# Patient Record
Sex: FEMALE | Race: WHITE | NOT HISPANIC OR LATINO | Employment: FULL TIME | URBAN - METROPOLITAN AREA
[De-identification: names, ages, dates, MRNs, and addresses within clinical notes are randomized per-mention and may not be internally consistent; named-entity substitution may affect disease eponyms.]

---

## 2023-08-08 ENCOUNTER — TELEPHONE (OUTPATIENT)
Dept: NEUROLOGY | Facility: CLINIC | Age: 33
End: 2023-08-08

## 2023-08-08 NOTE — TELEPHONE ENCOUNTER
[facility] Call [facility]    Phone Message    May a detailed message be left on voicemail: yes     Reason for Call: Other: Eform received requesting to see Dr. Arshad in the MS clinic. LVM for the patient to call back. No referral is in the system     Action Taken: Other: N/A    Travel Screening: Not Applicable

## 2023-08-11 ENCOUNTER — TRANSCRIBE ORDERS (OUTPATIENT)
Dept: OTHER | Age: 33
End: 2023-08-11

## 2023-08-11 DIAGNOSIS — G35 MULTIPLE SCLEROSIS (H): Primary | ICD-10-CM

## 2023-08-18 NOTE — TELEPHONE ENCOUNTER
Action 8/18/23 MV 2.12pm   Action Taken Imaging request faxed to Dayana Johnson , [facility]  Fax # [phone]  Tracking # [id number]     Action 8/21/23 MV 8.33am   Action Taken Dayana Johnson images resolved in PACS     Action Janet Calvin on 8/21/2023 at 1:04 PM   Action Taken Imaging disc received from [facility]. Sent to  for processing. -     RECORDS RECEIVED FROM: internal   REASON FOR VISIT: MS   Date of Appt:   9.27.23   NOTES (FOR ALL VISITS) STATUS DETAILS   OFFICE NOTE from referring provider Care Everywhere Dr Jean Hutchinson @ [facility]:  8/3/23   OFFICE NOTE from other specialist Care Everywhere Mer Jimenes NP @ [facility]:  7/7/23 5/11/23 1/20/23 11/18/22  (Additional encounters)    Dr Cas Palacios @ [facility]:  3/3/23    Dr Seven Stiles @ [facility]:  6/21/22    Dr Jessenia Mcrae @ [facility]:  6/7/22    Dr Evita Gillespie @ [facility]:  10/28/21  10/15/21  9/10/21   DISCHARGE SUMMARY from hospital Care Everywhere [facility]:  4/11/22-4/12/22    [facility]:  10/15/21-10/18/21   DISCHARGE REPORT from the ER Care Everywhere [facility]:  9/11/21    [facility]:  9/8/21   EMG Care Everywhere [facility]:  9/20/21   MEDICATION LIST Care Everywhere    IMAGING  (FOR ALL VISITS)     LUMBAR PUNCTURE Care Everywhere [facility]:  9/13/21   MRI (HEAD, NECK, SPINE) PACS [facility]:  MRI Brain 6/7/23  MRI Thoracic Spine 10/10/22  MRI Cervical Spine 10/10/22  MRI Brain 4/11/22  MRI Cervical Spine 4/11/22    [facility]:  MRI Cervical Spine 1/14/22  MRI Brain 1/14/22  MRI Thoracic Spine 11/2/21  MRI Brain 10/16/21  MRI Cervical Spine 10/16/21  MRI Thoracic Spine 9/9/21  MRI Cervical Spine 9/9/21    [facility]:  MRI Brain 9/8/21   CT (HEAD, NECK, SPINE) PACS [facility]:  CTA Head Neck 10/15/21  CT Head 10/15/21

## 2023-08-23 ENCOUNTER — DOCUMENTATION ONLY (OUTPATIENT)
Dept: NEUROLOGY | Facility: CLINIC | Age: 33
End: 2023-08-23
Payer: OTHER GOVERNMENT

## 2023-08-23 NOTE — PROGRESS NOTES
Image disc received from [facility] for MRI's of the brain,cervical, thoracic, pelvis and chest.   Renny Chaudhari EMT 08/23/2023 11:06AM

## 2023-08-30 ENCOUNTER — PRE VISIT (OUTPATIENT)
Dept: NEUROLOGY | Facility: CLINIC | Age: 33
End: 2023-08-30
Payer: OTHER GOVERNMENT

## 2023-09-27 ENCOUNTER — OFFICE VISIT (OUTPATIENT)
Dept: NEUROLOGY | Facility: CLINIC | Age: 33
End: 2023-09-27
Attending: PSYCHIATRY & NEUROLOGY
Payer: OTHER GOVERNMENT

## 2023-09-27 VITALS
HEART RATE: 92 BPM | DIASTOLIC BLOOD PRESSURE: 86 MMHG | SYSTOLIC BLOOD PRESSURE: 119 MMHG | HEIGHT: 67 IN | OXYGEN SATURATION: 98 % | BODY MASS INDEX: 45.99 KG/M2 | WEIGHT: 293 LBS

## 2023-09-27 DIAGNOSIS — Z51.81 THERAPEUTIC DRUG MONITORING: ICD-10-CM

## 2023-09-27 DIAGNOSIS — Z13.21 ENCOUNTER FOR VITAMIN DEFICIENCY SCREENING: ICD-10-CM

## 2023-09-27 DIAGNOSIS — G35 MS (MULTIPLE SCLEROSIS) (H): Primary | ICD-10-CM

## 2023-09-27 PROCEDURE — G0463 HOSPITAL OUTPT CLINIC VISIT: HCPCS | Performed by: PSYCHIATRY & NEUROLOGY

## 2023-09-27 PROCEDURE — 99205 OFFICE O/P NEW HI 60 MIN: CPT | Performed by: PSYCHIATRY & NEUROLOGY

## 2023-09-27 RX ORDER — ALBUTEROL SULFATE 90 UG/1
1-2 AEROSOL, METERED RESPIRATORY (INHALATION)
Status: CANCELLED
Start: 2024-01-01

## 2023-09-27 RX ORDER — METHYLPREDNISOLONE SODIUM SUCCINATE 125 MG/2ML
125 INJECTION, POWDER, LYOPHILIZED, FOR SOLUTION INTRAMUSCULAR; INTRAVENOUS
Status: CANCELLED
Start: 2024-01-01

## 2023-09-27 RX ORDER — LEVOTHYROXINE SODIUM 75 UG/1
75 TABLET ORAL
COMMUNITY
Start: 2023-08-01 | End: 2024-08-02

## 2023-09-27 RX ORDER — ACETAMINOPHEN 325 MG/1
650 TABLET ORAL ONCE
Status: CANCELLED | OUTPATIENT
Start: 2024-01-01

## 2023-09-27 RX ORDER — METHYLPREDNISOLONE SODIUM SUCCINATE 125 MG/2ML
125 INJECTION, POWDER, LYOPHILIZED, FOR SOLUTION INTRAMUSCULAR; INTRAVENOUS ONCE
Status: CANCELLED | OUTPATIENT
Start: 2024-01-01

## 2023-09-27 RX ORDER — ALBUTEROL SULFATE 0.83 MG/ML
2.5 SOLUTION RESPIRATORY (INHALATION)
Status: CANCELLED | OUTPATIENT
Start: 2024-01-01

## 2023-09-27 RX ORDER — HEPARIN SODIUM (PORCINE) LOCK FLUSH IV SOLN 100 UNIT/ML 100 UNIT/ML
5 SOLUTION INTRAVENOUS
Status: CANCELLED | OUTPATIENT
Start: 2024-01-01

## 2023-09-27 RX ORDER — MEPERIDINE HYDROCHLORIDE 25 MG/ML
25 INJECTION INTRAMUSCULAR; INTRAVENOUS; SUBCUTANEOUS EVERY 30 MIN PRN
Status: CANCELLED | OUTPATIENT
Start: 2024-01-01

## 2023-09-27 RX ORDER — DIPHENHYDRAMINE HYDROCHLORIDE 50 MG/ML
50 INJECTION INTRAMUSCULAR; INTRAVENOUS ONCE
Status: CANCELLED
Start: 2024-01-01

## 2023-09-27 RX ORDER — HEPARIN SODIUM,PORCINE 10 UNIT/ML
5-20 VIAL (ML) INTRAVENOUS DAILY PRN
Status: CANCELLED | OUTPATIENT
Start: 2024-01-01

## 2023-09-27 RX ORDER — CETIRIZINE HYDROCHLORIDE 10 MG/1
10 TABLET ORAL DAILY
COMMUNITY
End: 2024-04-10

## 2023-09-27 RX ORDER — DIPHENHYDRAMINE HYDROCHLORIDE 50 MG/ML
50 INJECTION INTRAMUSCULAR; INTRAVENOUS
Status: CANCELLED
Start: 2024-01-01

## 2023-09-27 RX ORDER — EPINEPHRINE 1 MG/ML
0.3 INJECTION, SOLUTION, CONCENTRATE INTRAVENOUS EVERY 5 MIN PRN
Status: CANCELLED | OUTPATIENT
Start: 2024-01-01

## 2023-09-27 RX ORDER — CHOLECALCIFEROL (VITAMIN D3) 50 MCG
50 TABLET ORAL DAILY
COMMUNITY

## 2023-09-27 ASSESSMENT — PAIN SCALES - GENERAL: PAINLEVEL: NO PAIN (0)

## 2023-09-27 NOTE — PATIENT INSTRUCTIONS
I am glad to hear that you are doing well     Continue ocrevus     Blood work today and on the day of infusion     Mri spinal cord     Follow up in 6 months

## 2023-09-27 NOTE — PROGRESS NOTES
Date of Service: 9/27/2023    [facility] Neurology   MS Clinic Evaluation    Subjective: 33-year-old woman with a history of hypothyroidism who presents for evaluation of multiple sclerosis.    Disease onset at age 31 when she had an episode of sensory myelitis.  This started in the right abdomen and then progressed to affect the entire lower half of the body.  Symptoms persisted for approximately 3 months.  Imaging at that time was unremarkable.  CSF was positive for oligoclonal bands, so multiple sclerosis was suspected.  Approximately 1 month later she experienced Lhermitte's phenomenon with symptoms radiating into the left arm.  MRI was positive for a demyelinating lesion in the cervical spine.  She was initiated with treatment with Copaxone.  Symptoms gradually resolved over the course of a few months.    She does have residual symptoms from her initial attack.  Her left side will feel weak and will intermittently tingle.  She has occasional neck pain.  However she does notice in the past 6 months the symptoms have reduced.    In the year of 2022 she struggled with fluctuating cognitive fog.  Symptoms could be quite significant at times.  She had an MRI that was performed in April 2022 which was remarkable for new gadolinium enhancing lesion in the left centrum semiovale.  They recommended watchful waiting.  However follow-up imaging later that year revealed 3 new lesions.  She was subsequently switched to ocrelizumab.    She received her first dose of ocrelizumab in December 2022.  She did have itching as a side effect.  This has been adequately managed when she is premedicated with IV Benadryl.  She received her most recent dose of ocrelizumab on July 1.    She feels that in the past several weeks that she has been feeling quite better.  Cognitive fog has reduced.  She is noticing less symptoms on the left side.  However 3 weeks ago she did have a couple days of cognitive fog and some return of left-sided  "weakness.  This only lasted a couple of days and then resolved.    She did have a 1.5 week long episode of weakness that was more prominent on the right side in July. This was in the setting of heightened stress/[place].    She has not struggled with infections aside from 1 possible urinary tract infection, though she does report that UA was negative.    She does take vitamin D3 1000 international units daily.    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  Last relapse: Age 32, cognitive fog in the setting of MRI positive for active lesions    DMD hx:   Glatiramer 12/2021-12/2022, radiologic and clinical progression  Ocrevus 12/2/2022-present, LD 6/12/23    No Known Allergies    Current Outpatient Medications   Medication     cetirizine (ZYRTEC) 10 MG tablet     levothyroxine (SYNTHROID/LEVOTHROID) 75 MCG tablet     ocrelizumab (OCREVUS) 300 MG/10ML SOLN injection     vitamin D3 (CHOLECALCIFEROL) 50 mcg (2000 units) tablet     No current facility-administered medications for this visit.        Past medical, surgical, social and family history was personally reviewed. Pertinent details noted above.     Physical Examination:   /86 (BP Location: Right arm, Patient Position: Sitting, Cuff Size: Adult Large)   Pulse 92   Ht 1.7 m (5' 6.93\")   Wt 144.4 kg (318 lb 6.4 oz)   SpO2 98%   BMI 49.97 kg/m      General: no acute distress  Cranial nerves:   VFFC  PERRL w/no RAPD  EOM full w/no YANCY   Face symmetric  Hearing intact  No dysarthria   Motor:   Tone is normal   Bulk is normal     R L  Deltoid  5 5  Biceps  5 5  Triceps 5 5  Wrist ext 5 5  Finger ext 5 5  Finger abd 5 5    Hip flexion 5 5  Knee flexion 5 5  Knee ext 5 5  Ankle d/f 5 5    Reflexes: 2+ and symmetric throughout, babinski absent bilaterally  Sensory: vibration is mildly reduced in the L toe, JPS normal in the toes   Romberg is absent  Coordination: no ataxia or dysmetria  Gait: normal base and stride, " tandem gait is intact, able to balance on one foot and hop x 5 bilaterally    Tests/Imaging:   CSF +ocb    Vitamin D 12  JCV Ab 0.58    ALC 1600  [id number]    MRI Brain  6/2023 - approx 5 PVL/DWML, dirty white matter, a bit of atrophy for age, gd-     MRI Cervical spine   10/2022 -?upper cervical right dorsal cord lesion, images fairly grainy    MRI Thoracic spine   10/2022- couple mid thoracic lesions, gd-     Assessment: 33-year-old woman with relapsing remitting multiple sclerosis who had persistent disease activity despite compliance with glatiramer acetate and has appropriately been switched to ocrelizumab.  She seems to be tolerating treatment well.  Her clinical examination is nearly normal.    It is appropriate for her to continue to receive ocrelizumab every 6 months.  I would like her to undergo an updated MRI of the spinal cord to ensure no evidence of new lesion formation given the episode that she had in July.    Plan:   -Continue Ocrevus 600 mg IV every 6 months, next dose due January 1  - Blood work today and again on the day of infusion to monitor for hematologic or immunologic toxicity  - MRI cervical and thoracic spine  - Follow-up in 6 months    Note was completed with the assistance of Dragon Fluency software which can often result in accidental word substitutions.     A total of 60 minutes on the date of service were spent in the care of this patient.   Mohini Arshad MD on 9/27/2023 at 12:48 PM

## 2023-09-27 NOTE — NURSING NOTE
Chief Complaint   Patient presents with    MS    New Patient     Establishing MS care      Vitals were taken and medications were reconciled.   Renny Chaudhari, EMT  2:35 PM

## 2023-09-28 ENCOUNTER — DOCUMENTATION ONLY (OUTPATIENT)
Dept: NEUROLOGY | Facility: CLINIC | Age: 33
End: 2023-09-28
Payer: OTHER GOVERNMENT

## 2023-09-28 NOTE — PROGRESS NOTES
Ocrevus access solution notice received, patients service request is currently in process.   [id number].  Renny Chaudhari EMT 09/28/2023 2:29PM

## 2023-10-03 ENCOUNTER — TELEPHONE (OUTPATIENT)
Dept: NEUROLOGY | Facility: CLINIC | Age: 33
End: 2023-10-03

## 2023-10-03 DIAGNOSIS — G35 MULTIPLE SCLEROSIS (H): Primary | ICD-10-CM

## 2023-10-03 NOTE — TELEPHONE ENCOUNTER
Pt to continue Ocrevus, with next dose due Jan 1, 2024. Last infusuion 7/1/23 with [facility] in [place], [place]. Pt to have blood work completed now and again on day of infusion. Pt has a lab appt scheduled for this afternoon. Ocrevus orders have been entered by Dr Arshad and start form faxed to [facility]. Inbox message sent to pt advising her to schedule infusion.     Rox Adair RN

## 2023-10-06 ENCOUNTER — LAB (OUTPATIENT)
Dept: LAB | Facility: CLINIC | Age: 33
End: 2023-10-06
Payer: OTHER GOVERNMENT

## 2023-10-06 DIAGNOSIS — Z51.81 THERAPEUTIC DRUG MONITORING: ICD-10-CM

## 2023-10-06 DIAGNOSIS — Z13.21 ENCOUNTER FOR VITAMIN DEFICIENCY SCREENING: ICD-10-CM

## 2023-10-06 DIAGNOSIS — G35 MS (MULTIPLE SCLEROSIS) (H): ICD-10-CM

## 2023-10-06 LAB
BASO+EOS+MONOS # BLD AUTO: NORMAL 10*3/UL
BASO+EOS+MONOS NFR BLD AUTO: NORMAL %
BASOPHILS # BLD AUTO: 0.1 10E3/UL (ref 0–0.2)
BASOPHILS NFR BLD AUTO: 1 %
EOSINOPHIL # BLD AUTO: 0.1 10E3/UL (ref 0–0.7)
EOSINOPHIL NFR BLD AUTO: 1 %
ERYTHROCYTE [DISTWIDTH] IN BLOOD BY AUTOMATED COUNT: 12.9 % (ref 10–15)
FOLATE SERPL-MCNC: 9.5 NG/ML (ref 4.6–34.8)
HCT VFR BLD AUTO: 40.6 % (ref 35–47)
HGB BLD-MCNC: 13.3 G/DL (ref 11.7–15.7)
IMM GRANULOCYTES # BLD: 0 10E3/UL
IMM GRANULOCYTES NFR BLD: 0 %
LYMPHOCYTES # BLD AUTO: 1.7 10E3/UL (ref 0.8–5.3)
LYMPHOCYTES NFR BLD AUTO: 23 %
MCH RBC QN AUTO: 29.2 PG (ref 26.5–33)
MCHC RBC AUTO-ENTMCNC: 32.8 G/DL (ref 31.5–36.5)
MCV RBC AUTO: 89 FL (ref 78–100)
MONOCYTES # BLD AUTO: 0.7 10E3/UL (ref 0–1.3)
MONOCYTES NFR BLD AUTO: 9 %
NEUTROPHILS # BLD AUTO: 4.9 10E3/UL (ref 1.6–8.3)
NEUTROPHILS NFR BLD AUTO: 66 %
PLATELET # BLD AUTO: 386 10E3/UL (ref 150–450)
RBC # BLD AUTO: 4.55 10E6/UL (ref 3.8–5.2)
VIT B12 SERPL-MCNC: 391 PG/ML (ref 232–1245)
VIT D+METAB SERPL-MCNC: 20 NG/ML (ref 20–50)
WBC # BLD AUTO: 7.4 10E3/UL (ref 4–11)

## 2023-10-06 PROCEDURE — 85025 COMPLETE CBC W/AUTO DIFF WBC: CPT

## 2023-10-06 PROCEDURE — 36415 COLL VENOUS BLD VENIPUNCTURE: CPT

## 2023-10-06 PROCEDURE — 86355 B CELLS TOTAL COUNT: CPT

## 2023-10-06 PROCEDURE — 82784 ASSAY IGA/IGD/IGG/IGM EACH: CPT | Mod: 59

## 2023-10-06 PROCEDURE — 86787 VARICELLA-ZOSTER ANTIBODY: CPT

## 2023-10-06 PROCEDURE — 82306 VITAMIN D 25 HYDROXY: CPT

## 2023-10-06 PROCEDURE — 82746 ASSAY OF FOLIC ACID SERUM: CPT

## 2023-10-06 PROCEDURE — 82607 VITAMIN B-12: CPT

## 2023-10-06 PROCEDURE — 82784 ASSAY IGA/IGD/IGG/IGM EACH: CPT

## 2023-10-07 LAB
CD19 B CELL COMMENT: ABNORMAL
CD19 CELLS # BLD: 2 CELLS/UL (ref 107–698)
CD19 CELLS NFR BLD: <1 % (ref 6–27)

## 2023-10-08 ENCOUNTER — HOSPITAL ENCOUNTER (OUTPATIENT)
Dept: MRI IMAGING | Facility: CLINIC | Age: 33
Discharge: HOME | End: 2023-10-08
Attending: PSYCHIATRY & NEUROLOGY
Payer: OTHER GOVERNMENT

## 2023-10-08 DIAGNOSIS — G35 MS (MULTIPLE SCLEROSIS) (H): ICD-10-CM

## 2023-10-08 PROCEDURE — 255N000002 HC RX 255 OP 636: Performed by: PSYCHIATRY & NEUROLOGY

## 2023-10-08 PROCEDURE — 72157 MRI CHEST SPINE W/O & W/DYE: CPT

## 2023-10-08 PROCEDURE — A9585 GADOBUTROL INJECTION: HCPCS | Performed by: PSYCHIATRY & NEUROLOGY

## 2023-10-08 PROCEDURE — 72156 MRI NECK SPINE W/O & W/DYE: CPT

## 2023-10-08 RX ORDER — GADOBUTROL 604.72 MG/ML
14 INJECTION INTRAVENOUS ONCE
Status: COMPLETED | OUTPATIENT
Start: 2023-10-08 | End: 2023-10-08

## 2023-10-08 RX ADMIN — GADOBUTROL 14 ML: 604.72 INJECTION INTRAVENOUS at 09:47

## 2023-10-09 LAB
IGA SERPL-MCNC: 202 MG/DL (ref 84–499)
IGG SERPL-MCNC: 836 MG/DL (ref 610–1616)
IGM SERPL-MCNC: 147 MG/DL (ref 35–242)
VZV IGG SER QL IA: 2574 INDEX
VZV IGG SER QL IA: POSITIVE

## 2023-10-19 LAB — SCANNED LAB RESULT: ABNORMAL

## 2023-10-29 ENCOUNTER — HEALTH MAINTENANCE LETTER (OUTPATIENT)
Age: 33
End: 2023-10-29

## 2023-12-13 ENCOUNTER — OFFICE VISIT (OUTPATIENT)
Dept: NEUROLOGY | Facility: CLINIC | Age: 33
End: 2023-12-13
Attending: PSYCHIATRY & NEUROLOGY
Payer: OTHER GOVERNMENT

## 2023-12-13 VITALS
RESPIRATION RATE: 16 BRPM | DIASTOLIC BLOOD PRESSURE: 84 MMHG | SYSTOLIC BLOOD PRESSURE: 138 MMHG | HEART RATE: 84 BPM | OXYGEN SATURATION: 97 %

## 2023-12-13 DIAGNOSIS — G35 MS (MULTIPLE SCLEROSIS) (H): Primary | ICD-10-CM

## 2023-12-13 PROCEDURE — 99213 OFFICE O/P EST LOW 20 MIN: CPT | Performed by: PSYCHIATRY & NEUROLOGY

## 2023-12-13 PROCEDURE — 99214 OFFICE O/P EST MOD 30 MIN: CPT | Mod: GC | Performed by: PSYCHIATRY & NEUROLOGY

## 2023-12-13 PROCEDURE — G0463 HOSPITAL OUTPT CLINIC VISIT: HCPCS | Performed by: PSYCHIATRY & NEUROLOGY

## 2023-12-13 ASSESSMENT — PAIN SCALES - GENERAL: PAINLEVEL: NO PAIN (0)

## 2023-12-13 NOTE — PROGRESS NOTES
Date of Service: 12/13/2023    [facility] Neurology   MS Clinic Evaluation    HPI and Subjective:     32 yo female with Hx of hypothyroidisma and MS presenting for the follow up.     Disease onset at age 31 when she had an episode of sensory myelitis.  This started in the right abdomen and then progressed to affect the entire lower half of the body.  Symptoms persisted for approximately 3 months.  Imaging at that time was unremarkable.  CSF was positive for oligoclonal bands, so multiple sclerosis was suspected.  Approximately 1 month later she experienced Lhermitte's phenomenon with symptoms radiating into the left arm.  MRI was positive for a demyelinating lesion in the cervical spine.  She was initiated with treatment with Copaxone.  Symptoms gradually resolved over the course of a few months.     In the year of 2022 she struggled with fluctuating cognitive fog.  Symptoms could be quite significant at times.  She had an MRI that was performed in April 2022 which was remarkable for new gadolinium enhancing lesion in the left centrum semiovale.  They recommended watchful waiting.  However follow-up imaging later that year revealed 3 new lesions.  She was subsequently switched to ocrelizumab.     She received her first dose of ocrelizumab in December 2022.  She did have itching as a side effect.  This has been adequately managed when she is premedicated with IV Benadryl.  She received her most recent dose of ocrelizumab on July 1.    The next infusion is due in January 2024    Interval Hx:  Patient is reporting improvement in her symptoms since starting Ocreveus infusions. Her left sided weakness is much better and mental fog  is significantly better. She does experience wearing off phenomemon as her symptoms are worse 2-3 weeks prior to her infusion. Otherwise denies new numbness, tingling, weakness, bowel and bladder incontinence etc.     The most  active complain today is the is a weird visual phenomena that she  "has been experiencing symptoms for last 2 weeks, that were preceded by headaches.  She describes them as \" watching old TV with  nothing playing on the channel\".  It Is there all the time and affecting her work and ability to drive.  She was seen by ophthalmologist and no etiology was found.  She has been getting eyedrops that help her for few minutes to clear her vision but the effect does not last long.      Prior to the symptoms, she had root canal done, which is thought to be infected and she received Augmentin.  Right after stopping Augmentin, she started having headaches, followed by visual phenomena.    She has been taking vitamin D 5000 units daily.       No Known Allergies    Current Outpatient Medications   Medication    cetirizine (ZYRTEC) 10 MG tablet    levothyroxine (SYNTHROID/LEVOTHROID) 75 MCG tablet    ocrelizumab (OCREVUS) 300 MG/10ML SOLN injection    vitamin D3 (CHOLECALCIFEROL) 50 mcg (2000 units) tablet     No current facility-administered medications for this visit.        Past medical, surgical, social and family history was personally reviewed. Pertinent details noted above.     Physical Examination:   /84 (BP Location: Right arm, Patient Position: Sitting, Cuff Size: Adult Large)   Pulse 84   Resp 16   SpO2 97%     General: no acute distress  Cranial nerves:   VFFC  PERRL w/no RAPD  EOM full w/no YANCY   Face symmetric  Hearing intact  No dysarthria   Motor:   Tone is normal   Bulk is normal     R L  Deltoid  5 5  Biceps  5 5  Triceps 5 5  Wrist ext 5 5  Finger ext 5 5  Finger abd 5 5    Hip flexion 5 5  Knee flexion 5 5  Knee ext 5 5  Ankle d/f 5 5    Reflexes: 2+ and symmetric throughout, babinski absent bilaterally  Sensory: vibration is normal in the toes,  Romberg is absent  Coordination: no ataxia or dysmetria  Gait: normal base and stride, tandem gait is intact, able to balance on one foot and hop x 5 bilaterally    Tests/Imaging:   CSF +ocb     Vitamin D 12  JCV Ab 0.58   "   ALC 1600  [id number]       MRI C and T-spine 10/8/2023  1.  No definite abnormal spinal cord signal evident within the cervical spinal on today's study. Equivocal hyperintensity on the left at C4-C5 may be artifactual.  2.  No abnormal enhancement to indicate active demyelination within the cervical spinal cord.  3.  Stable degenerative changes most pronounced at C5-C6 and C6-C7 as detailed above.     MRI THORACIC SPINE:  1.  Normal thoracic spine MRI.    MRI Brain  6/2023 - approx 5 PVL/DWML, dirty white matter, a bit of atrophy for age, gd-      MRI Cervical spine   10/2022 -?upper cervical right dorsal cord lesion, images fairly grainy     MRI Thoracic spine   10/2022- couple mid thoracic lesions, gd-      Assessment:     #RRMS  # Suspected visual snow  32 yo female with Hx of hypothyroidisma and MS presenting for the follow up.  Patient seems to be stable clinically and radiologically from MS standpoint with Ocrevus infusions and has tolerated them well.     In terms of her neurological symptoms, the suspicion is that she is having visual snow.  Patient has appointment with neuro-ophthalmology in few days.  We do not think her symptoms are related to her MS, so no further imaging is indicated at this point      Plan:   -Continue with these infusions 6 monthly  -Follow-up in 6 months  -Follow-up neuroophthalmology    Patient was seen with .     Note was completed with the assistance of Dragon Fluency software which can often result in accidental word substitutions.         A total of 60 minutes on the date of service were spent in the care of this patient.   Ramya Todd MD on 12/13/2023 at 4:55 PM    TRACY Mcmahan, MS  Neurology PGY2

## 2023-12-13 NOTE — PATIENT INSTRUCTIONS
I suspect that the vision changes you are experiencing are visual snow     Use preservative free eye drops     See Dr. Amaya as scheduled     I mentioned that you could try riboflavin 400 mg daily and/or magnesium 400 mg daily     Blood testing on the day of your ocrevus infusion     Follow up in 6 months

## 2023-12-13 NOTE — NURSING NOTE
Chief Complaint   Patient presents with    RECHECK     Here for a follow up, confirmed with patient     Danielle Miranda

## 2023-12-13 NOTE — LETTER
12/13/2023       RE: Vanessa Arreola  [id number] [place] [address]       Dear Colleague,    Thank you for referring your patient, Vanessa Arreola, to the [facility]ULT[facility]LE [facility] [place] [facility]. Please see a copy of my visit note below.    Date of Service: 12/13/2023    [facility] Neurology   MS Clinic Evaluation    HPI and Subjective:     32 yo female with Hx of hypothyroidisma and MS presenting for the follow up.     Disease onset at age 31 when she had an episode of sensory myelitis.  This started in the right abdomen and then progressed to affect the entire lower half of the body.  Symptoms persisted for approximately 3 months.  Imaging at that time was unremarkable.  CSF was positive for oligoclonal bands, so multiple sclerosis was suspected.  Approximately 1 month later she experienced Lhermitte's phenomenon with symptoms radiating into the left arm.  MRI was positive for a demyelinating lesion in the cervical spine.  She was initiated with treatment with Copaxone.  Symptoms gradually resolved over the course of a few months.     In the year of 2022 she struggled with fluctuating cognitive fog.  Symptoms could be quite significant at times.  She had an MRI that was performed in April 2022 which was remarkable for new gadolinium enhancing lesion in the left centrum semiovale.  They recommended watchful waiting.  However follow-up imaging later that year revealed 3 new lesions.  She was subsequently switched to ocrelizumab.     She received her first dose of ocrelizumab in December 2022.  She did have itching as a side effect.  This has been adequately managed when she is premedicated with IV Benadryl.  She received her most recent dose of ocrelizumab on July 1.    The next infusion is due in January 2024    Interval Hx:  Patient is reporting improvement in her symptoms since starting Ocreveus infusions. Her left sided weakness is  "much better and mental fog  is significantly better. She does experience wearing off phenomemon as her symptoms are worse 2-3 weeks prior to her infusion. Otherwise denies new numbness, tingling, weakness, bowel and bladder incontinence etc.     The most  active complain today is the is a weird visual phenomena that she has been experiencing symptoms for last 2 weeks, that were preceded by headaches.  She describes them as \" watching old TV with  nothing playing on the channel\".  It Is there all the time and affecting her work and ability to drive.  She was seen by ophthalmologist and no etiology was found.  She has been getting eyedrops that help her for few minutes to clear her vision but the effect does not last long.      Prior to the symptoms, she had root canal done, which is thought to be infected and she received Augmentin.  Right after stopping Augmentin, she started having headaches, followed by visual phenomena.    She has been taking vitamin D 5000 units daily.       No Known Allergies    Current Outpatient Medications   Medication    cetirizine (ZYRTEC) 10 MG tablet    levothyroxine (SYNTHROID/LEVOTHROID) 75 MCG tablet    ocrelizumab (OCREVUS) 300 MG/10ML SOLN injection    vitamin D3 (CHOLECALCIFEROL) 50 mcg (2000 units) tablet     No current facility-administered medications for this visit.        Past medical, surgical, social and family history was personally reviewed. Pertinent details noted above.     Physical Examination:   /84 (BP Location: Right arm, Patient Position: Sitting, Cuff Size: Adult Large)   Pulse 84   Resp 16   SpO2 97%     General: no acute distress  Cranial nerves:   VFFC  PERRL w/no RAPD  EOM full w/no YANCY   Face symmetric  Hearing intact  No dysarthria   Motor:   Tone is normal   Bulk is normal     R L  Deltoid  5 5  Biceps  5 5  Triceps 5 5  Wrist ext 5 5  Finger ext 5 5  Finger abd 5 5    Hip flexion 5 5  Knee flexion 5 5  Knee ext 5 5  Ankle d/f 5 5    Reflexes: 2+ " and symmetric throughout, babinski absent bilaterally  Sensory: vibration is normal in the toes,  Romberg is absent  Coordination: no ataxia or dysmetria  Gait: normal base and stride, tandem gait is intact, able to balance on one foot and hop x 5 bilaterally    Tests/Imaging:   CSF +ocb     Vitamin D 12  JCV Ab 0.58     ALC 1600  [id number]       MRI C and T-spine 10/8/2023  1.  No definite abnormal spinal cord signal evident within the cervical spinal on today's study. Equivocal hyperintensity on the left at C4-C5 may be artifactual.  2.  No abnormal enhancement to indicate active demyelination within the cervical spinal cord.  3.  Stable degenerative changes most pronounced at C5-C6 and C6-C7 as detailed above.     MRI THORACIC SPINE:  1.  Normal thoracic spine MRI.    MRI Brain  6/2023 - approx 5 PVL/DWML, dirty white matter, a bit of atrophy for age, gd-      MRI Cervical spine   10/2022 -?upper cervical right dorsal cord lesion, images fairly grainy     MRI Thoracic spine   10/2022- couple mid thoracic lesions, gd-      Assessment:     #RRMS  # Suspected visual snow  34 yo female with Hx of hypothyroidisma and MS presenting for the follow up.  Patient seems to be stable clinically and radiologically from MS standpoint with Ocrevus infusions and has tolerated them well.     In terms of her neurological symptoms, the suspicion is that she is having visual snow.  Patient has appointment with neuro-ophthalmology in few days.  We do not think her symptoms are related to her MS, so no further imaging is indicated at this point      Plan:   -Continue with these infusions 6 monthly  -Follow-up in 6 months  -Follow-up neuroophthalmology    Patient was seen with .     Note was completed with the assistance of Dragon Fluency software which can often result in accidental word substitutions.       A total of 60 minutes on the date of service were spent in the care of this patient.   Ramya Todd MD on 12/13/2023 at  4:55 PM    TRACY Mcmahan, MS  Neurology PGY2    Attestation signed by Mohini Arshad MD at 12/17/2023  8:17 AM:  I personally saw and evaluated Mrs. Arreola with Dr. Todd on the date of service.  I have reviewed the above documentation and agree with the findings and recommendations.     Suspect visual snow phenomenon   Reviewed how this is not related to MS   Might consider trial of migraine medication, though ultimately would defer to neuro-ophthalmology given upcoming appointment     Continue ocreuvs q6mo   No evidence of toxicity or intolerance  Imaging due in 1 year     Follow up in 6 months      A total of 30 minutes were personally spent in the care of this patient on the date of service.         Again, thank you for allowing me to participate in the care of your patient.      Sincerely,    Mohini Arshad MD

## 2023-12-18 ENCOUNTER — DOCUMENTATION ONLY (OUTPATIENT)
Dept: NEUROLOGY | Facility: CLINIC | Age: 33
End: 2023-12-18
Payer: OTHER GOVERNMENT

## 2023-12-18 NOTE — PROGRESS NOTES
Ophthalmology visit report has been received from [facility], report placed in Dr. Arshad's folder for review and signature.   Renny LINDO 12/18/2023 2:22PM

## 2023-12-19 ENCOUNTER — TRANSFERRED RECORDS (OUTPATIENT)
Dept: HEALTH INFORMATION MANAGEMENT | Facility: CLINIC | Age: 33
End: 2023-12-19
Payer: OTHER GOVERNMENT

## 2023-12-27 NOTE — TELEPHONE ENCOUNTER
[place] [facility] [place] clinic note, pt to have blood work on day of infusion. Standing orders for CBC with diff, CD19, and IgG entered on behalf of Dr Arshad.    Rox Adair RN

## 2024-01-31 ENCOUNTER — INFUSION THERAPY VISIT (OUTPATIENT)
Dept: INFUSION THERAPY | Facility: CLINIC | Age: 34
End: 2024-01-31
Attending: PSYCHIATRY & NEUROLOGY
Payer: OTHER GOVERNMENT

## 2024-01-31 VITALS
BODY MASS INDEX: 50.35 KG/M2 | TEMPERATURE: 98.1 F | SYSTOLIC BLOOD PRESSURE: 127 MMHG | OXYGEN SATURATION: 97 % | WEIGHT: 293 LBS | DIASTOLIC BLOOD PRESSURE: 75 MMHG | HEART RATE: 103 BPM | RESPIRATION RATE: 14 BRPM

## 2024-01-31 DIAGNOSIS — G35 MS (MULTIPLE SCLEROSIS) (H): Primary | ICD-10-CM

## 2024-01-31 DIAGNOSIS — G35 MULTIPLE SCLEROSIS (H): ICD-10-CM

## 2024-01-31 LAB
BASOPHILS # BLD AUTO: 0 10E3/UL (ref 0–0.2)
BASOPHILS NFR BLD AUTO: 0 %
CD19 B CELL COMMENT: ABNORMAL
CD19 CELLS # BLD: 22 CELLS/UL (ref 107–698)
CD19 CELLS NFR BLD: 1 % (ref 6–27)
EOSINOPHIL # BLD AUTO: 0.1 10E3/UL (ref 0–0.7)
EOSINOPHIL NFR BLD AUTO: 2 %
ERYTHROCYTE [DISTWIDTH] IN BLOOD BY AUTOMATED COUNT: 13.7 % (ref 10–15)
HCT VFR BLD AUTO: 37.3 % (ref 35–47)
HGB BLD-MCNC: 12.4 G/DL (ref 11.7–15.7)
IGG SERPL-MCNC: 775 MG/DL (ref 610–1616)
IMM GRANULOCYTES # BLD: 0 10E3/UL
IMM GRANULOCYTES NFR BLD: 0 %
LYMPHOCYTES # BLD AUTO: 1.5 10E3/UL (ref 0.8–5.3)
LYMPHOCYTES NFR BLD AUTO: 22 %
MCH RBC QN AUTO: 29.6 PG (ref 26.5–33)
MCHC RBC AUTO-ENTMCNC: 33.2 G/DL (ref 31.5–36.5)
MCV RBC AUTO: 89 FL (ref 78–100)
MONOCYTES # BLD AUTO: 0.6 10E3/UL (ref 0–1.3)
MONOCYTES NFR BLD AUTO: 8 %
NEUTROPHILS # BLD AUTO: 4.8 10E3/UL (ref 1.6–8.3)
NEUTROPHILS NFR BLD AUTO: 68 %
NRBC # BLD AUTO: 0 10E3/UL
NRBC BLD AUTO-RTO: 0 /100
PLATELET # BLD AUTO: 333 10E3/UL (ref 150–450)
RBC # BLD AUTO: 4.19 10E6/UL (ref 3.8–5.2)
WBC # BLD AUTO: 7 10E3/UL (ref 4–11)

## 2024-01-31 PROCEDURE — 86355 B CELLS TOTAL COUNT: CPT

## 2024-01-31 PROCEDURE — 250N000011 HC RX IP 250 OP 636: Performed by: PSYCHIATRY & NEUROLOGY

## 2024-01-31 PROCEDURE — 258N000003 HC RX IP 258 OP 636: Performed by: PSYCHIATRY & NEUROLOGY

## 2024-01-31 PROCEDURE — 96375 TX/PRO/DX INJ NEW DRUG ADDON: CPT

## 2024-01-31 PROCEDURE — 250N000013 HC RX MED GY IP 250 OP 250 PS 637: Performed by: PSYCHIATRY & NEUROLOGY

## 2024-01-31 PROCEDURE — 36415 COLL VENOUS BLD VENIPUNCTURE: CPT

## 2024-01-31 PROCEDURE — 96365 THER/PROPH/DIAG IV INF INIT: CPT

## 2024-01-31 PROCEDURE — 85004 AUTOMATED DIFF WBC COUNT: CPT

## 2024-01-31 PROCEDURE — 96376 TX/PRO/DX INJ SAME DRUG ADON: CPT

## 2024-01-31 PROCEDURE — 82784 ASSAY IGA/IGD/IGG/IGM EACH: CPT

## 2024-01-31 PROCEDURE — 96366 THER/PROPH/DIAG IV INF ADDON: CPT

## 2024-01-31 RX ORDER — EPINEPHRINE 1 MG/ML
0.3 INJECTION, SOLUTION INTRAMUSCULAR; SUBCUTANEOUS EVERY 5 MIN PRN
Status: CANCELLED | OUTPATIENT
Start: 2024-07-29

## 2024-01-31 RX ORDER — MEPERIDINE HYDROCHLORIDE 25 MG/ML
25 INJECTION INTRAMUSCULAR; INTRAVENOUS; SUBCUTANEOUS EVERY 30 MIN PRN
Status: CANCELLED | OUTPATIENT
Start: 2024-07-29

## 2024-01-31 RX ORDER — METHYLPREDNISOLONE SODIUM SUCCINATE 125 MG/2ML
125 INJECTION, POWDER, LYOPHILIZED, FOR SOLUTION INTRAMUSCULAR; INTRAVENOUS ONCE
Status: CANCELLED | OUTPATIENT
Start: 2024-07-29

## 2024-01-31 RX ORDER — ALBUTEROL SULFATE 0.83 MG/ML
2.5 SOLUTION RESPIRATORY (INHALATION)
Status: CANCELLED | OUTPATIENT
Start: 2024-07-29

## 2024-01-31 RX ORDER — METHYLPREDNISOLONE SODIUM SUCCINATE 125 MG/2ML
125 INJECTION, POWDER, LYOPHILIZED, FOR SOLUTION INTRAMUSCULAR; INTRAVENOUS ONCE
Status: COMPLETED | OUTPATIENT
Start: 2024-01-31 | End: 2024-01-31

## 2024-01-31 RX ORDER — METHYLPREDNISOLONE SODIUM SUCCINATE 125 MG/2ML
125 INJECTION, POWDER, LYOPHILIZED, FOR SOLUTION INTRAMUSCULAR; INTRAVENOUS
Status: CANCELLED
Start: 2024-07-29

## 2024-01-31 RX ORDER — ALBUTEROL SULFATE 90 UG/1
1-2 AEROSOL, METERED RESPIRATORY (INHALATION)
Status: CANCELLED
Start: 2024-07-29

## 2024-01-31 RX ORDER — HEPARIN SODIUM,PORCINE 10 UNIT/ML
5-20 VIAL (ML) INTRAVENOUS DAILY PRN
Status: CANCELLED | OUTPATIENT
Start: 2024-07-29

## 2024-01-31 RX ORDER — DIPHENHYDRAMINE HYDROCHLORIDE 50 MG/ML
50 INJECTION INTRAMUSCULAR; INTRAVENOUS
Status: CANCELLED
Start: 2024-07-29

## 2024-01-31 RX ORDER — ACETAMINOPHEN 325 MG/1
650 TABLET ORAL ONCE
Status: COMPLETED | OUTPATIENT
Start: 2024-01-31 | End: 2024-01-31

## 2024-01-31 RX ORDER — DIPHENHYDRAMINE HYDROCHLORIDE 50 MG/ML
50 INJECTION INTRAMUSCULAR; INTRAVENOUS
Status: COMPLETED | OUTPATIENT
Start: 2024-01-31 | End: 2024-01-31

## 2024-01-31 RX ORDER — METHYLPREDNISOLONE SODIUM SUCCINATE 125 MG/2ML
125 INJECTION, POWDER, LYOPHILIZED, FOR SOLUTION INTRAMUSCULAR; INTRAVENOUS
Status: DISCONTINUED | OUTPATIENT
Start: 2024-01-31 | End: 2024-01-31 | Stop reason: HOSPADM

## 2024-01-31 RX ORDER — ACETAMINOPHEN 325 MG/1
650 TABLET ORAL ONCE
Status: CANCELLED | OUTPATIENT
Start: 2024-07-29

## 2024-01-31 RX ORDER — HEPARIN SODIUM (PORCINE) LOCK FLUSH IV SOLN 100 UNIT/ML 100 UNIT/ML
5 SOLUTION INTRAVENOUS
Status: CANCELLED | OUTPATIENT
Start: 2024-07-29

## 2024-01-31 RX ADMIN — DIPHENHYDRAMINE HYDROCHLORIDE 50 MG: 50 INJECTION INTRAMUSCULAR; INTRAVENOUS at 09:33

## 2024-01-31 RX ADMIN — DIPHENHYDRAMINE HYDROCHLORIDE 50 MG: 50 INJECTION, SOLUTION INTRAMUSCULAR; INTRAVENOUS at 07:50

## 2024-01-31 RX ADMIN — ACETAMINOPHEN 650 MG: 325 TABLET ORAL at 07:31

## 2024-01-31 RX ADMIN — OCRELIZUMAB 600 MG: 300 INJECTION INTRAVENOUS at 08:22

## 2024-01-31 RX ADMIN — METHYLPREDNISOLONE SODIUM SUCCINATE 125 MG: 125 INJECTION, POWDER, FOR SOLUTION INTRAMUSCULAR; INTRAVENOUS at 07:48

## 2024-01-31 RX ADMIN — METHYLPREDNISOLONE SODIUM SUCCINATE 125 MG: 125 INJECTION, POWDER, FOR SOLUTION INTRAMUSCULAR; INTRAVENOUS at 09:35

## 2024-01-31 RX ADMIN — FAMOTIDINE 20 MG: 10 INJECTION, SOLUTION INTRAVENOUS at 09:39

## 2024-01-31 ASSESSMENT — PAIN SCALES - GENERAL: PAINLEVEL: NO PAIN (0)

## 2024-01-31 NOTE — PROGRESS NOTES
Infusion Nursing Note:  Vanessa COATES Wakerline presents today for maintenance Ocrevus.    Patient seen by provider today: No   present during visit today: Not Applicable.    Note:   Premeds: Tylenol, Benadryl, Solumedrol.  Ocrevus started at 40 ml/hr for 30 minutes, increased to 80 ml/hr for 30 minutes and upon switching to 120 ml/hr, patient experienced hypersensitivity reaction.   Did patient have a hypersensitivity reaction? : Yes  Drug or Product name: Ocrevus  Were pre-meds administered?: Yes  What pre-meds were administered?: Acetaminophen (Tylenol);Diphenhydramine (Benadryl);Methylprednisolone  First or Subsequent treatment: Subsequent infusion  Rate of infusion when patient had hypersensitivity reaction: 120 ml/hr  Time the hypersensitivity reaction was first recognized: 0956  Symptoms observed or reported (select all that apply): Itching;Other: (Comment) (itching throat, head and ears, feeling of swollen throat)  Interventions/treatment following reaction: Infusion stopped;Hypersensitivity medications administered  What hypersensitivity medications were administered?: DiphendydrAMINE (benadryl);Methylprednisolone;Famotidine(Pepcid)  Name of provider notified: Dr. Arshad  Time provider notified: 0962  Type of notification (select all that apply): Paged/Phone;Other: (Comment) (tried instant msg through epic and provider was offline, paged provider)     Ocrevus restarted at 60 ml/hr, increased by 40 ml/hr every 30 minutes as tolerated to max rate of 200 ml/hr.  Patient observed for 1 hour following infusion, per orders.     Intravenous Access:  Labs drawn without difficulty.  [facility] by [facility]    Treatment Conditions:  Biological Infusion Checklist:  ~~~ NOTE: If the patient answers yes to any of the questions below, hold the infusion and contact ordering provider or on-call provider.    Have you recently had an elevated temperature, fever, chills, productive cough, coughing for 3 weeks or longer  or hemoptysis,  abnormal vital signs, night sweats,  chest pain or have you noticed a decrease in your appetite, unexplained weight loss or fatigue? No  Do you have any open wounds or new incisions? No  Do you have any upcoming hospitalizations or surgeries? Does not include esophagogastroduodenoscopy, colonoscopy, endoscopic retrograde cholangiopancreatography (ERCP), endoscopic ultrasound (EUS), dental procedures or joint aspiration/steroid injections No  Do you currently have any signs of illness or infection or are you on any antibiotics? No  Have you had any new, sudden or worsening abdominal pain? No  Have you or anyone in your household received a live vaccination in the past 4 weeks? Please note: No live vaccines while on biologic/chemotherapy until 6 months after the last treatment. Patient can receive the flu vaccine (shot only), pneumovax and the Covid vaccine. It is optimal for the patient to get these vaccines mid cycle, but they can be given at any time as long as it is not on the day of the infusion. No  Have you recently been diagnosed with any new nervous system diseases (ie. Multiple sclerosis, Guillain Benedicta, seizures, neurological changes) or cancer diagnosis? Are you on any form of radiation or chemotherapy? No  Are you pregnant or breast feeding or do you have plans of pregnancy in the future? No  Have there been any other new onset medical symptoms? No    Administrations This Visit       acetaminophen (TYLENOL) tablet 650 mg       Admin Date  01/31/2024 Action  $Given Dose  650 mg Route  Oral Documented By  Elba Wallace, RN              diphenhydrAMINE (BENADRYL) 50 mg in sodium chloride 0.9 % 56 mL intermittent infusion       Admin Date  01/31/2024 Action  $New Bag Dose  50 mg Rate  336 mL/hr Route  Intravenous Documented By  Elba Wallace, RN              diphenhydrAMINE (BENADRYL) injection 50 mg       Admin Date  01/31/2024 Action  $Given Dose  50 mg Route  Intravenous Documented  By  Latia Giraldo RN              famotidine (PEPCID) injection 20 mg       Admin Date  01/31/2024 Action  $Given Dose  20 mg Route  Intravenous Documented By  Latia Giraldo RN              methylPREDNISolone sodium succinate (solu-MEDROL) injection 125 mg       Admin Date  01/31/2024 Action  $Given Dose  125 mg Route  Intravenous Documented By  Elba Wallace RN               Admin Date  01/31/2024 Action  $Given Dose  125 mg Route  Intravenous Documented By  Latia Giraldo RN              ocrelizumab (OCREVUS) 600 mg in sodium chloride 0.9 % 500 mL infusion       Admin Date  01/31/2024 Action  $New Bag Dose  600 mg Route  Intravenous Documented By  Elba Wallace RN               Admin Date  01/31/2024 Action  Restarted Dose   Route  Intravenous Documented By  Elba Wallace RN                  /84 (BP Location: Left arm, Patient Position: Semi-Fish's, Cuff Size: Adult Large)   Pulse 75   Temp 98.1  F (36.7  C) (Oral)   Resp 14   Wt 145.5 kg (320 lb 12.8 oz)   SpO2 96%   BMI 50.35 kg/m      Post Infusion Assessment:  Patient tolerated infusion after hypersensitivity   Patient observed for 60 minutes post Ocrevus per protocol.  Blood return noted pre and post infusion.  Site patent and intact, free from redness, edema or discomfort.  No evidence of extravasations.  Access discontinued per protocol.  Biologic Infusion Post Education: Call the triage nurse at your clinic or seek medical attention if you have chills and/or temperature greater than or equal to 100.5, uncontrolled nausea/vomiting, diarrhea, constipation, dizziness, shortness of breath, chest pain, heart palpitations, weakness or any other new or concerning symptoms, questions or concerns.  You cannot have any live virus vaccines prior to or during treatment or up to 6 months post infusion.  If you have an upcoming surgery, medical procedure or dental procedure during treatment, this should be discussed with your ordering  physician and your surgeon/dentist.  If you are having any concerning symptom, if you are unsure if you should get your next infusion or wish to speak to a provider before your next infusion, please call your care coordinator or triage nurse at your clinic to notify them so we can adequately serve you.       Discharge Plan:   Discharge instructions reviewed with: Patient.  Patient and/or family verbalized understanding of discharge instructions and all questions answered.  AVS to patient via [employer].  Patient will return 7/31/24 for next appointment.   Patient discharged in stable condition accompanied by: .  Departure Mode: Ambulatory.    Elba Wallace RN

## 2024-01-31 NOTE — PATIENT INSTRUCTIONS
Dear Vanessa Arreola    Thank you for choosing [facility] ([facility]) for your infusion.  The following information is a summary of our appointment as well as important reminders.      EDUCATION POST BIOLOGICAL/CHEMOTHERAPY INFUSION  Call the triage nurse at your clinic or seek medical attention if you have chills and/or temperature greater than or equal to 100.5, uncontrolled nausea/vomiting, diarrhea, constipation, dizziness, shortness of breath, chest pain, heart palpitations, weakness or any other new or concerning symptoms, questions or concerns.  You can not have any live virus vaccines prior to or during treatment or up to 6 months post infusion.  If you have an upcoming surgery, medical procedure or dental procedure during treatment, this should be discussed with your ordering physician and your surgeon/dentist.  If you are having any concerning symptom, if you are unsure if you should get your next infusion or wish to speak to a provider before your next infusion, please call your care coordinator or triage nurse at your clinic to notify them so we can adequately serve you.   We look forward in seeing you on your next appointment here at [facility] ([facility]).  Please don t hesitate to call us at [phone] to reschedule any of your appointments or to speak with one of the [facility] registered nurses.  It was a pleasure taking care of you today.    Sincerely,    [facility]  [address] [place] [address]  [address]  Phone:  [phone]

## 2024-04-10 ENCOUNTER — OFFICE VISIT (OUTPATIENT)
Dept: NEUROLOGY | Facility: CLINIC | Age: 34
End: 2024-04-10
Attending: PSYCHIATRY & NEUROLOGY
Payer: OTHER GOVERNMENT

## 2024-04-10 VITALS
DIASTOLIC BLOOD PRESSURE: 87 MMHG | HEART RATE: 93 BPM | RESPIRATION RATE: 18 BRPM | OXYGEN SATURATION: 98 % | SYSTOLIC BLOOD PRESSURE: 117 MMHG | BODY MASS INDEX: 47.09 KG/M2 | HEIGHT: 66 IN | WEIGHT: 293 LBS

## 2024-04-10 DIAGNOSIS — R41.89 COGNITIVE CHANGE: ICD-10-CM

## 2024-04-10 DIAGNOSIS — G35 MS (MULTIPLE SCLEROSIS) (H): Primary | ICD-10-CM

## 2024-04-10 PROCEDURE — 99214 OFFICE O/P EST MOD 30 MIN: CPT | Performed by: PSYCHIATRY & NEUROLOGY

## 2024-04-10 PROCEDURE — 99213 OFFICE O/P EST LOW 20 MIN: CPT | Mod: 27 | Performed by: PSYCHIATRY & NEUROLOGY

## 2024-04-10 PROCEDURE — G0463 HOSPITAL OUTPT CLINIC VISIT: HCPCS

## 2024-04-10 RX ORDER — MULTIVITAMIN WITH IRON
1 TABLET ORAL DAILY
COMMUNITY
End: 2024-04-10

## 2024-04-10 ASSESSMENT — PAIN SCALES - GENERAL: PAINLEVEL: NO PAIN (0)

## 2024-04-10 NOTE — PATIENT INSTRUCTIONS
Your exam is reassuring    However, it is reasonable to consider new MS inflammation as a cause for your recent change in symptoms     MRI brain and cervical spine     Follow up in 6 months

## 2024-04-10 NOTE — PROGRESS NOTES
Date of Service: 4/10/2024    [facility] Neurology   MS Clinic Evaluation    Subjective: 34-year-old woman with a history of hypothyroidism who presents for evaluation of multiple sclerosis.    In early March she had a viral illness.  This included slight cough, congestion.  She was COVID-negative, but symptoms were fairly bothersome and persisted for approximately 2 weeks.  The week after recovery she started to experience an increase in brain fog.  The past week and a half this is gotten significantly worse.  The brain fog can be so intense that she does not even feel awake.    During this time she has also had some headaches.  This is not usual for her.  They are typically frontal and pressure-like in nature.  She also has occasional pain in the face.  She found that aspirin was effective in managing these.  However she does note that the headaches have reduced in intensity and no longer needs aspirin as of the past couple days.    For a few days she had intermittent pain in her arms and legs.  Her left side feels weak again.  She feels dizzy, particularly when riding in the car.  She has difficulty tolerating the movement.    She notes that she did have some return of the cough and did have some dysgeusia.  She did a saline nasal rinse and the symptoms resolved.    Her last dose of Ocrevus was administered in the end of January.  This was delayed because she was sick before her early January appointment.    She has a history of brain fog that occurred prior to starting to Ocrevus, but has not suffered from brain fog this intense since being on Ocrevus.    She does take vitamin D3 1000 international units daily.    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  Last relapse: Age 32, cognitive fog in the setting of MRI positive for active lesions    DMD hx:   Glatiramer 12/2021-12/2022, radiologic and clinical progression  Ocrevus 12/2/2022-present, LD 6/12/23; 1/31/24    No Known  "Allergies    Current Outpatient Medications   Medication Sig Dispense Refill    levothyroxine (SYNTHROID/LEVOTHROID) 75 MCG tablet Take 75 mcg by mouth      ocrelizumab (OCREVUS) 300 MG/10ML SOLN injection Pt gets IV every 6 months for MS      vitamin C with B complex (B COMPLEX-C) tablet Take 1 tablet by mouth daily      vitamin D3 (CHOLECALCIFEROL) 50 mcg (2000 units) tablet Take 50 mcg by mouth daily      cetirizine (ZYRTEC) 10 MG tablet Take 10 mg by mouth daily (Patient not taking: Reported on 4/10/2024)       No current facility-administered medications for this visit.        Past medical, surgical, social and family history was personally reviewed. Pertinent details noted above.     Physical Examination:   /87 (BP Location: Right arm, Patient Position: Sitting, Cuff Size: Adult Large)   Pulse 93   Resp 18   Ht 1.685 m (5' 6.34\")   Wt 149.6 kg (329 lb 11.2 oz)   SpO2 98%   BMI 52.67 kg/m      General: no acute distress  Cranial nerves:   VFFC  PERRL w/no RAPD  EOM full w/no YANCY   Face symmetric  Hearing intact  No dysarthria   Motor:   Tone is normal   Bulk is normal     R L  Deltoid  5 5  Biceps  5 5  Triceps 5 5  Wrist ext 5 5  Finger ext 5 5  Finger abd 5 5    Hip flexion 5 5  Knee flexion 5 5  Knee ext 5 5  Ankle d/f 5 5    Reflexes: 2+ and symmetric throughout, babinski absent bilaterally  Sensory: vibration is mildly reduced in the toes, JPS normal in the toes   Romberg is absent  Coordination: no ataxia or dysmetria  Gait: normal base and stride, tandem gait is intact, able to balance on one foot and hop x 5 bilaterally    Tests/Imaging:   CSF +ocb    Vitamin D 12  JCV Ab 0.58    ALC 1600-> 1500  [id number] -> 775  Cd19 22    MRI Brain  6/2023 - approx 5 PVL/DWML, dirty white matter, a bit of atrophy for age, gd-     MRI Cervical spine   10/2022 -?upper cervical right dorsal cord lesion, images fairly grainy    MRI Thoracic spine   10/2022- couple mid thoracic lesions, gd-     Assessment: " 34-year-old woman with relapsing remitting multiple sclerosis who has been clinically and radiologically stable on ocrelizumab, but recently experienced a significant increase in brain fog a couple weeks after having a viral illness.  This is concerning for recurrence of disease activity.  I recommended updating MRI of the brain and cervical spine.    We briefly discussed what advancement of treatment would look like.  Specific treatments could include with Lemtrada or Mavenclad.    We also discussed the nature of a pseudo relapse.    Plan:     - MRI brain and cervical spine  - If MRI stable, then can continue with Ocrevus 600 mg every 6 months  - Follow-up in 6 months, though earlier follow-up will be arranged if MRI reveals new lesions    Note was completed with the assistance of Dragon Fluency software which can often result in accidental word substitutions.     A total of 30 minutes on the date of service were spent in the care of this patient.   Mohini Arshad MD on 4/10/2024 at 11:44 AM

## 2024-04-10 NOTE — LETTER
4/10/2024       RE: Vanessa Arreola  [address]  [place] [address]       Dear Colleague,    Thank you for referring your patient, Vanessa Arreola, to the [facility] [place] at [facility]. Please see a copy of my visit note below.    Date of Service: 4/10/2024    [facility] Neurology   MS Clinic Evaluation    Subjective: 34-year-old woman with a history of hypothyroidism who presents for evaluation of multiple sclerosis.    In early March she had a viral illness.  This included slight cough, congestion.  She was COVID-negative, but symptoms were fairly bothersome and persisted for approximately 2 weeks.  The week after recovery she started to experience an increase in brain fog.  The past week and a half this is gotten significantly worse.  The brain fog can be so intense that she does not even feel awake.    During this time she has also had some headaches.  This is not usual for her.  They are typically frontal and pressure-like in nature.  She also has occasional pain in the face.  She found that aspirin was effective in managing these.  However she does note that the headaches have reduced in intensity and no longer needs aspirin as of the past couple days.    For a few days she had intermittent pain in her arms and legs.  Her left side feels weak again.  She feels dizzy, particularly when riding in the car.  She has difficulty tolerating the movement.    She notes that she did have some return of the cough and did have some dysgeusia.  She did a saline nasal rinse and the symptoms resolved.    Her last dose of Ocrevus was administered in the end of January.  This was delayed because she was sick before her early January appointment.    She has a history of brain fog that occurred prior to starting to Ocrevus, but has not suffered from brain fog this intense since being on Ocrevus.    She does take vitamin D3 1000 international  "units daily.    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  Last relapse: Age 32, cognitive fog in the setting of MRI positive for active lesions    DMD hx:   Glatiramer 12/2021-12/2022, radiologic and clinical progression  Ocrevus 12/2/2022-present, LD 6/12/23; 1/31/24    No Known Allergies    Current Outpatient Medications   Medication Sig Dispense Refill    levothyroxine (SYNTHROID/LEVOTHROID) 75 MCG tablet Take 75 mcg by mouth      ocrelizumab (OCREVUS) 300 MG/10ML SOLN injection Pt gets IV every 6 months for MS      vitamin C with B complex (B COMPLEX-C) tablet Take 1 tablet by mouth daily      vitamin D3 (CHOLECALCIFEROL) 50 mcg (2000 units) tablet Take 50 mcg by mouth daily      cetirizine (ZYRTEC) 10 MG tablet Take 10 mg by mouth daily (Patient not taking: Reported on 4/10/2024)       No current facility-administered medications for this visit.        Past medical, surgical, social and family history was personally reviewed. Pertinent details noted above.     Physical Examination:   /87 (BP Location: Right arm, Patient Position: Sitting, Cuff Size: Adult Large)   Pulse 93   Resp 18   Ht 1.685 m (5' 6.34\")   Wt 149.6 kg (329 lb 11.2 oz)   SpO2 98%   BMI 52.67 kg/m      General: no acute distress  Cranial nerves:   VFFC  PERRL w/no RAPD  EOM full w/no YANCY   Face symmetric  Hearing intact  No dysarthria   Motor:   Tone is normal   Bulk is normal     R L  Deltoid  5 5  Biceps  5 5  Triceps 5 5  Wrist ext 5 5  Finger ext 5 5  Finger abd 5 5    Hip flexion 5 5  Knee flexion 5 5  Knee ext 5 5  Ankle d/f 5 5    Reflexes: 2+ and symmetric throughout, babinski absent bilaterally  Sensory: vibration is mildly reduced in the toes, JPS normal in the toes   Romberg is absent  Coordination: no ataxia or dysmetria  Gait: normal base and stride, tandem gait is intact, able to balance on one foot and hop x 5 bilaterally    Tests/Imaging:   CSF +ocb    Vitamin D 12  JCV Ab " 0.58    ALC 1600-> 1500  [id number] -> 775  Cd19 22    MRI Brain  6/2023 - approx 5 PVL/DWML, dirty white matter, a bit of atrophy for age, gd-     MRI Cervical spine   10/2022 -?upper cervical right dorsal cord lesion, images fairly grainy    MRI Thoracic spine   10/2022- couple mid thoracic lesions, gd-     Assessment: 34-year-old woman with relapsing remitting multiple sclerosis who has been clinically and radiologically stable on ocrelizumab, but recently experienced a significant increase in brain fog a couple weeks after having a viral illness.  This is concerning for recurrence of disease activity.  I recommended updating MRI of the brain and cervical spine.    We briefly discussed what advancement of treatment would look like.  Specific treatments could include with Lemtrada or Mavenclad.    We also discussed the nature of a pseudo relapse.    Plan:     - MRI brain and cervical spine  - If MRI stable, then can continue with Ocrevus 600 mg every 6 months  - Follow-up in 6 months, though earlier follow-up will be arranged if MRI reveals new lesions    Note was completed with the assistance of Dragon Fluency software which can often result in accidental word substitutions.     A total of 30 minutes on the date of service were spent in the care of this patient.   Mohini Arshad MD on 4/10/2024 at 11:44 AM          Again, thank you for allowing me to participate in the care of your patient.      Sincerely,    Mohini Arshad MD

## 2024-04-12 ENCOUNTER — ANCILLARY PROCEDURE (OUTPATIENT)
Dept: MRI IMAGING | Facility: CLINIC | Age: 34
End: 2024-04-12
Attending: PSYCHIATRY & NEUROLOGY
Payer: OTHER GOVERNMENT

## 2024-04-12 DIAGNOSIS — G35 MS (MULTIPLE SCLEROSIS) (H): ICD-10-CM

## 2024-04-12 DIAGNOSIS — R41.89 COGNITIVE CHANGE: ICD-10-CM

## 2024-04-12 PROCEDURE — 70553 MRI BRAIN STEM W/O & W/DYE: CPT | Performed by: RADIOLOGY

## 2024-04-12 PROCEDURE — A9585 GADOBUTROL INJECTION: HCPCS | Performed by: RADIOLOGY

## 2024-04-12 PROCEDURE — 72156 MRI NECK SPINE W/O & W/DYE: CPT | Mod: GC | Performed by: RADIOLOGY

## 2024-04-12 RX ORDER — GADOBUTROL 604.72 MG/ML
15 INJECTION INTRAVENOUS ONCE
Status: COMPLETED | OUTPATIENT
Start: 2024-04-12 | End: 2024-04-12

## 2024-04-12 RX ADMIN — GADOBUTROL 15 ML: 604.72 INJECTION INTRAVENOUS at 10:12

## 2024-04-12 NOTE — DISCHARGE INSTRUCTIONS
MRI Contrast Discharge Instructions    The IV contrast you received today will pass out of your body in your  urine. This will happen in the next 24 hours. You will not feel this process.  Your urine will not change color.    Drink at least 4 extra glasses of water or juice today (unless your doctor  has restricted your fluids). This reduces the stress on your kidneys.  You may take your regular medicines.    If you are on dialysis: It is best to have dialysis today.    If you have a reaction: Most reactions happen right away. If you have  any new symptoms after leaving the hospital (such as hives or swelling),  call your hospital at the correct number below. Or call your family doctor.  If you have breathing distress or wheezing, call 911.    Special instructions: ***    I have read and understand the above information.    Signature:______________________________________ Date:___________    Staff:__________________________________________ Date:___________     Time:__________    [place] [facility] Departments:    ___[place]: [phone]  ___[facility]: [phone]  ___[place]: [phone] ___[facility]: [phone]  ___[facility]: [phone]  ___[facility]: [phone]  ___[phone]  ___[facility]: [phone]  ___[phone]

## 2024-06-25 ENCOUNTER — TELEPHONE (OUTPATIENT)
Dept: NEUROLOGY | Facility: CLINIC | Age: 34
End: 2024-06-25
Payer: OTHER GOVERNMENT

## 2024-06-25 NOTE — TELEPHONE ENCOUNTER
Left Voicemail (1st Attempt) and Sent Mychart (1st Attempt) for the patient to call back and schedule the following:    Appointment type: Resched August Appt   Provider: Dr. Arshad  Return date: August 2024  Specialty phone number: [phone]  Additional appointment(s) needed:   Additonal Notes:     no loss of consciousness, no gait abnormality, no headache, no sensory deficits, and no weakness.

## 2024-07-10 ENCOUNTER — VIRTUAL VISIT (OUTPATIENT)
Dept: NEUROLOGY | Facility: CLINIC | Age: 34
End: 2024-07-10
Attending: PSYCHIATRY & NEUROLOGY
Payer: OTHER GOVERNMENT

## 2024-07-10 VITALS — BODY MASS INDEX: 45.99 KG/M2 | WEIGHT: 293 LBS | HEIGHT: 67 IN

## 2024-07-10 DIAGNOSIS — G35 MS (MULTIPLE SCLEROSIS) (H): Primary | ICD-10-CM

## 2024-07-10 PROCEDURE — 99213 OFFICE O/P EST LOW 20 MIN: CPT | Mod: 95 | Performed by: PSYCHIATRY & NEUROLOGY

## 2024-07-10 PROCEDURE — G2211 COMPLEX E/M VISIT ADD ON: HCPCS | Mod: 95 | Performed by: PSYCHIATRY & NEUROLOGY

## 2024-07-10 ASSESSMENT — PAIN SCALES - GENERAL: PAINLEVEL: MODERATE PAIN (4)

## 2024-07-10 NOTE — NURSING NOTE
Current patient location: [id number] [facility] [address]  [place] [address]    Is the patient currently in the state of [place]? YES    Visit mode:VIDEO    If the visit is dropped, the patient can be reconnected by: VIDEO VISIT: Text to cell phone:   Telephone Information:   Mobile [phone]       Will anyone else be joining the visit? NO  (If patient encounters technical issues they should call [phone])    How would you like to obtain your AVS? MyChart    Are changes needed to the allergy or medication list? Pt stated no changes to allergies and Pt stated no med changes    Are refills needed on medications prescribed by this physician? NO    Reason for visit: MONTY HOROWITZF

## 2024-07-10 NOTE — PATIENT INSTRUCTIONS
I am glad to hear that you are feeling better     If you have another bout of worsening symptoms after an illness, I would prescribe steroids     Proceed with ocrevus     I do not think that repeat imaging at this time would be revealing     Follow up as scheduled

## 2024-07-10 NOTE — LETTER
7/10/2024       RE: Vanessa Arreola  [id number]  [place] [address]     Dear Colleague,    Thank you for referring your patient, Vanessa Arreola, to the [facility] MULTIPLE SCLEROSIS CLINIC [place] at [facility]. Please see a copy of my visit note below.    Date of Service: 7/10/2024    [facility] Neurology   MS Clinic Follow-up     Subjective: 34-year-old woman who presents in follow-up for multiple sclerosis.    This visit was scheduled because of worsening symptoms.  She recalls that in early to mid June she had an illness.  This lasted approximately 1-1/2 weeks.  She recalls feeling better for 1 to 2 weeks, then in the end of June she started to experience new symptoms.  Her entire left leg felt weak.  It was notable at times and felt like it was getting give out.  She also developed a pain that was present in both of her arms and legs.  This was a sore sensation as if she had overdone it, though she had not done any significant physical exertion.  Symptoms persisted for 1 to 2 weeks and then seem to resolve without intervention.  She was seen in the ER where testing was performed and negative for evidence of recurrent illness.  Steroids were suggested, but she elected to forego them.    She notes that her vision was a little bit more blurry during this time.  She also experienced some brain fog, though this has improved.    She denied any changes in bladder or bowel control.  She felt dizzy while the symptoms were present, but does not have any falls.    He does note that in the past couple days she has struggled with some back pain, though the back pain was not present at the time or worsening symptoms    Has been on Ocrevus and has her next dose scheduled in a few weeks.    No Known Allergies    Current Outpatient Medications   Medication Sig Dispense Refill     levothyroxine (SYNTHROID/LEVOTHROID) 75 MCG tablet Take 75 mcg by mouth       ocrelizumab  "(OCREVUS) 300 MG/10ML SOLN injection Pt gets IV every 6 months for MS       vitamin D3 (CHOLECALCIFEROL) 50 mcg (2000 units) tablet Take 50 mcg by mouth daily       No current facility-administered medications for this visit.        Past medical, surgical, social and family history was personally reviewed. Pertinent details noted above.     Physical Examination:   Ht 1.702 m (5' 7\")   Wt 142.9 kg (315 lb)   BMI 49.34 kg/m      General: no acute distress    Tests/Imaging:   MRI brain and cervical spine performed in April were stable with no evidence of new or active lesions    Assessment: 34-year-old woman with relapsing remitting multiple sclerosis who has been clinically and radiologically stable on ocrelizumab.  This is a second event where she experienced notable symptoms after an illness.  We discussed how typically this would be considered a pseudo exacerbation.  However if symptoms occur after resolution of her illness, we could consider a course of steroids.  Rationale was discussed.    She is advised to continue with ocrelizumab.  Blood work is advised to be done on the day of infusion    Next MRI should be done on 7 Jeanette to identify low-grade inflammation.    Plan:   -Proceed with Ocrevus  - Blood work on the day of infusion  - Follow-up as scheduled    Note was completed with the assistance of Dragon Fluency software which can often result in accidental word substitutions.   The longitudinal plan of care for the diagnosis(es)/condition(s) as documented were addressed during this visit. Due to the added complexity in care, I will continue to support Vanessa in the subsequent management and with ongoing continuity of care.    A total of 25 minutes on the date of service were spent in the care of this patient.   Mohini Arshad MD on 7/10/2024 at 8:41 AM        Virtual Visit Details    Type of service:  Video Visit     Originating Location (pt. Location): Home    Distant Location (provider " location):  On-site  Platform used for Video Visit: AmWell      Again, thank you for allowing me to participate in the care of your patient.      Sincerely,    Mohini Arshad MD

## 2024-07-10 NOTE — PROGRESS NOTES
Date of Service: 7/10/2024    [facility] Neurology   MS Clinic Follow-up     Subjective: 34-year-old woman who presents in follow-up for multiple sclerosis.    This visit was scheduled because of worsening symptoms.  She recalls that in early to mid June she had an illness.  This lasted approximately 1-1/2 weeks.  She recalls feeling better for 1 to 2 weeks, then in the end of June she started to experience new symptoms.  Her entire left leg felt weak.  It was notable at times and felt like it was getting give out.  She also developed a pain that was present in both of her arms and legs.  This was a sore sensation as if she had overdone it, though she had not done any significant physical exertion.  Symptoms persisted for 1 to 2 weeks and then seem to resolve without intervention.  She was seen in the ER where testing was performed and negative for evidence of recurrent illness.  Steroids were suggested, but she elected to forego them.    She notes that her vision was a little bit more blurry during this time.  She also experienced some brain fog, though this has improved.    She denied any changes in bladder or bowel control.  She felt dizzy while the symptoms were present, but does not have any falls.    He does note that in the past couple days she has struggled with some back pain, though the back pain was not present at the time or worsening symptoms    Has been on Ocrevus and has her next dose scheduled in a few weeks.    No Known Allergies    Current Outpatient Medications   Medication Sig Dispense Refill    levothyroxine (SYNTHROID/LEVOTHROID) 75 MCG tablet Take 75 mcg by mouth      ocrelizumab (OCREVUS) 300 MG/10ML SOLN injection Pt gets IV every 6 months for MS      vitamin D3 (CHOLECALCIFEROL) 50 mcg (2000 units) tablet Take 50 mcg by mouth daily       No current facility-administered medications for this visit.        Past medical, surgical, social and family history was personally reviewed. Pertinent  "details noted above.     Physical Examination:   Ht 1.702 m (5' 7\")   Wt 142.9 kg (315 lb)   BMI 49.34 kg/m      General: no acute distress    Tests/Imaging:   MRI brain and cervical spine performed in April were stable with no evidence of new or active lesions    Assessment: 34-year-old woman with relapsing remitting multiple sclerosis who has been clinically and radiologically stable on ocrelizumab.  This is a second event where she experienced notable symptoms after an illness.  We discussed how typically this would be considered a pseudo exacerbation.  However if symptoms occur after resolution of her illness, we could consider a course of steroids.  Rationale was discussed.    She is advised to continue with ocrelizumab.  Blood work is advised to be done on the day of infusion    Next MRI should be done on 7 Jeanette to identify low-grade inflammation.    Plan:   -Proceed with Ocrevus  - Blood work on the day of infusion  - Follow-up as scheduled    Note was completed with the assistance of Dragon Fluency software which can often result in accidental word substitutions.   The longitudinal plan of care for the diagnosis(es)/condition(s) as documented were addressed during this visit. Due to the added complexity in care, I will continue to support Vanessa in the subsequent management and with ongoing continuity of care.    A total of 25 minutes on the date of service were spent in the care of this patient.   Mohini Arshad MD on 7/10/2024 at 8:41 AM        Virtual Visit Details    Type of service:  Video Visit     Originating Location (pt. Location): Home    Distant Location (provider location):  On-site  Platform used for Video Visit: Tana  "

## 2024-07-31 ENCOUNTER — INFUSION THERAPY VISIT (OUTPATIENT)
Dept: INFUSION THERAPY | Facility: CLINIC | Age: 34
End: 2024-07-31
Attending: PSYCHIATRY & NEUROLOGY
Payer: OTHER GOVERNMENT

## 2024-07-31 VITALS
BODY MASS INDEX: 51.09 KG/M2 | DIASTOLIC BLOOD PRESSURE: 76 MMHG | HEART RATE: 94 BPM | SYSTOLIC BLOOD PRESSURE: 127 MMHG | RESPIRATION RATE: 16 BRPM | OXYGEN SATURATION: 97 % | WEIGHT: 293 LBS | TEMPERATURE: 98.1 F

## 2024-07-31 DIAGNOSIS — G35 MULTIPLE SCLEROSIS (H): ICD-10-CM

## 2024-07-31 DIAGNOSIS — G35 MS (MULTIPLE SCLEROSIS) (H): Primary | ICD-10-CM

## 2024-07-31 LAB
BASOPHILS # BLD AUTO: 0.1 10E3/UL (ref 0–0.2)
BASOPHILS NFR BLD AUTO: 1 %
CD19 B CELL COMMENT: ABNORMAL
CD19 CELLS # BLD: 4 CELLS/UL (ref 107–698)
CD19 CELLS NFR BLD: <1 % (ref 6–27)
EOSINOPHIL # BLD AUTO: 0.2 10E3/UL (ref 0–0.7)
EOSINOPHIL NFR BLD AUTO: 2 %
ERYTHROCYTE [DISTWIDTH] IN BLOOD BY AUTOMATED COUNT: 14.6 % (ref 10–15)
HCT VFR BLD AUTO: 36.6 % (ref 35–47)
HGB BLD-MCNC: 12.2 G/DL (ref 11.7–15.7)
IGG SERPL-MCNC: 774 MG/DL (ref 610–1616)
IMM GRANULOCYTES # BLD: 0 10E3/UL
IMM GRANULOCYTES NFR BLD: 0 %
LYMPHOCYTES # BLD AUTO: 2.1 10E3/UL (ref 0.8–5.3)
LYMPHOCYTES NFR BLD AUTO: 20 %
MCH RBC QN AUTO: 28.9 PG (ref 26.5–33)
MCHC RBC AUTO-ENTMCNC: 33.3 G/DL (ref 31.5–36.5)
MCV RBC AUTO: 87 FL (ref 78–100)
MONOCYTES # BLD AUTO: 1 10E3/UL (ref 0–1.3)
MONOCYTES NFR BLD AUTO: 10 %
NEUTROPHILS # BLD AUTO: 7 10E3/UL (ref 1.6–8.3)
NEUTROPHILS NFR BLD AUTO: 67 %
NRBC # BLD AUTO: 0 10E3/UL
NRBC BLD AUTO-RTO: 0 /100
PLATELET # BLD AUTO: 351 10E3/UL (ref 150–450)
RBC # BLD AUTO: 4.22 10E6/UL (ref 3.8–5.2)
WBC # BLD AUTO: 10.3 10E3/UL (ref 4–11)

## 2024-07-31 PROCEDURE — 96376 TX/PRO/DX INJ SAME DRUG ADON: CPT

## 2024-07-31 PROCEDURE — 96375 TX/PRO/DX INJ NEW DRUG ADDON: CPT

## 2024-07-31 PROCEDURE — 250N000011 HC RX IP 250 OP 636: Performed by: PSYCHIATRY & NEUROLOGY

## 2024-07-31 PROCEDURE — 258N000003 HC RX IP 258 OP 636: Performed by: PSYCHIATRY & NEUROLOGY

## 2024-07-31 PROCEDURE — 96366 THER/PROPH/DIAG IV INF ADDON: CPT

## 2024-07-31 PROCEDURE — 250N000013 HC RX MED GY IP 250 OP 250 PS 637: Performed by: PSYCHIATRY & NEUROLOGY

## 2024-07-31 PROCEDURE — 36415 COLL VENOUS BLD VENIPUNCTURE: CPT

## 2024-07-31 PROCEDURE — 86355 B CELLS TOTAL COUNT: CPT

## 2024-07-31 PROCEDURE — 82784 ASSAY IGA/IGD/IGG/IGM EACH: CPT

## 2024-07-31 PROCEDURE — 85025 COMPLETE CBC W/AUTO DIFF WBC: CPT

## 2024-07-31 PROCEDURE — 96365 THER/PROPH/DIAG IV INF INIT: CPT

## 2024-07-31 RX ORDER — ALBUTEROL SULFATE 0.83 MG/ML
2.5 SOLUTION RESPIRATORY (INHALATION)
OUTPATIENT
Start: 2025-01-25

## 2024-07-31 RX ORDER — METHYLPREDNISOLONE SODIUM SUCCINATE 125 MG/2ML
125 INJECTION, POWDER, LYOPHILIZED, FOR SOLUTION INTRAMUSCULAR; INTRAVENOUS ONCE
Status: COMPLETED | OUTPATIENT
Start: 2024-07-31 | End: 2024-07-31

## 2024-07-31 RX ORDER — HEPARIN SODIUM,PORCINE 10 UNIT/ML
5-20 VIAL (ML) INTRAVENOUS DAILY PRN
OUTPATIENT
Start: 2025-01-25

## 2024-07-31 RX ORDER — METHYLPREDNISOLONE SODIUM SUCCINATE 125 MG/2ML
125 INJECTION, POWDER, LYOPHILIZED, FOR SOLUTION INTRAMUSCULAR; INTRAVENOUS ONCE
OUTPATIENT
Start: 2025-01-25

## 2024-07-31 RX ORDER — METHYLPREDNISOLONE SODIUM SUCCINATE 125 MG/2ML
125 INJECTION, POWDER, LYOPHILIZED, FOR SOLUTION INTRAMUSCULAR; INTRAVENOUS
Start: 2025-01-25

## 2024-07-31 RX ORDER — ACETAMINOPHEN 325 MG/1
650 TABLET ORAL ONCE
Status: COMPLETED | OUTPATIENT
Start: 2024-07-31 | End: 2024-07-31

## 2024-07-31 RX ORDER — MEPERIDINE HYDROCHLORIDE 25 MG/ML
25 INJECTION INTRAMUSCULAR; INTRAVENOUS; SUBCUTANEOUS EVERY 30 MIN PRN
OUTPATIENT
Start: 2025-01-25

## 2024-07-31 RX ORDER — DIPHENHYDRAMINE HYDROCHLORIDE 50 MG/ML
50 INJECTION INTRAMUSCULAR; INTRAVENOUS
Start: 2025-01-25

## 2024-07-31 RX ORDER — METHYLPREDNISOLONE SODIUM SUCCINATE 125 MG/2ML
125 INJECTION, POWDER, LYOPHILIZED, FOR SOLUTION INTRAMUSCULAR; INTRAVENOUS
Status: DISCONTINUED | OUTPATIENT
Start: 2024-07-31 | End: 2024-07-31 | Stop reason: HOSPADM

## 2024-07-31 RX ORDER — ACETAMINOPHEN 325 MG/1
650 TABLET ORAL ONCE
OUTPATIENT
Start: 2025-01-25

## 2024-07-31 RX ORDER — EPINEPHRINE 1 MG/ML
0.3 INJECTION, SOLUTION INTRAMUSCULAR; SUBCUTANEOUS EVERY 5 MIN PRN
OUTPATIENT
Start: 2025-01-25

## 2024-07-31 RX ORDER — HEPARIN SODIUM (PORCINE) LOCK FLUSH IV SOLN 100 UNIT/ML 100 UNIT/ML
5 SOLUTION INTRAVENOUS
OUTPATIENT
Start: 2025-01-25

## 2024-07-31 RX ORDER — DIPHENHYDRAMINE HYDROCHLORIDE 50 MG/ML
50 INJECTION INTRAMUSCULAR; INTRAVENOUS
Status: COMPLETED | OUTPATIENT
Start: 2024-07-31 | End: 2024-07-31

## 2024-07-31 RX ORDER — ALBUTEROL SULFATE 90 UG/1
1-2 AEROSOL, METERED RESPIRATORY (INHALATION)
Start: 2025-01-25

## 2024-07-31 RX ADMIN — METHYLPREDNISOLONE SODIUM SUCCINATE 125 MG: 125 INJECTION, POWDER, FOR SOLUTION INTRAMUSCULAR; INTRAVENOUS at 09:12

## 2024-07-31 RX ADMIN — DIPHENHYDRAMINE HYDROCHLORIDE 50 MG: 50 INJECTION, SOLUTION INTRAMUSCULAR; INTRAVENOUS at 07:32

## 2024-07-31 RX ADMIN — DIPHENHYDRAMINE HYDROCHLORIDE 50 MG: 50 INJECTION INTRAMUSCULAR; INTRAVENOUS at 09:00

## 2024-07-31 RX ADMIN — OCRELIZUMAB 600 MG: 300 INJECTION INTRAVENOUS at 07:52

## 2024-07-31 RX ADMIN — ACETAMINOPHEN 650 MG: 325 TABLET ORAL at 07:26

## 2024-07-31 RX ADMIN — METHYLPREDNISOLONE SODIUM SUCCINATE 125 MG: 125 INJECTION, POWDER, FOR SOLUTION INTRAMUSCULAR; INTRAVENOUS at 07:27

## 2024-07-31 ASSESSMENT — PAIN SCALES - GENERAL: PAINLEVEL: NO PAIN (0)

## 2024-07-31 NOTE — PATIENT INSTRUCTIONS
Dear Vanessa Arreola    Thank you for choosing [facility] ([facility] for your infusion.  The following information is a summary of our appointment as well as important reminders.      Last dose of Tylenol was  0730.,     EDUCATION POST BIOLOGICAL/CHEMOTHERAPY INFUSION  Call the triage nurse at your clinic or seek medical attention if you have chills and/or temperature greater than or equal to 100.5, uncontrolled nausea/vomiting, diarrhea, constipation, dizziness, shortness of breath, chest pain, heart palpitations, weakness or any other new or concerning symptoms, questions or concerns.  You can not have any live virus vaccines prior to or during treatment or up to 6 months post infusion.  If you have an upcoming surgery, medical procedure or dental procedure during treatment, this should be discussed with your ordering physician and your surgeon/dentist.  If you are having any concerning symptom, if you are unsure if you should get your next infusion or wish to speak to a provider before your next infusion, please call your care coordinator or triage nurse at your clinic to notify them so we can adequately serve you.   If you have any questions on your upcoming Specialty Infusion appointments, please call scheduling at [phone].  It was a pleasure taking care of you today.    Sincerely,    [facility]  [address] [place] [address]  [id number]  Phone:  [phone]

## 2024-07-31 NOTE — PROGRESS NOTES
Infusion Nursing Note:  Vanessa COATES Wakerline presents today for Ocrevus.    Frequency: every 6 months  Patient seen by provider today: No   present during visit today: Not Applicable.    Note:   Premeds: Tylenol, Benadryl, Solumedrol.  Ocrevus started at 40 ml/hr, increased to 80 ml/hr after 30 minutes. 60 minutes into infusion, patient experienced hypersensitivity reaction.     Did patient have a hypersensitivity reaction? : Yes  Drug or Product name: Ocrevus  Were pre-meds administered?: Yes  What pre-meds were administered?: Acetaminophen (Tylenol);Diphenhydramine (Benadryl);Methylprednisolone  First or Subsequent treatment: Subsequent infusion  Rate of infusion when patient had hypersensitivity reaction: 80 ml/hr (60 ml infused, just about to switch to 120 ml/hr step)  Time the hypersensitivity reaction was first recognized: 0900  Symptoms observed or reported (select all that apply): Itching (throat and ears)  Interventions/treatment following reaction: Infusion stopped;Hypersensitivity medications administered  What hypersensitivity medications were administered?: DiphendydrAMINE (benadryl);Methylprednisolone  Name of provider notified: Dr. Arshad  Time provider notified: 0907  Type of notification (select all that apply): Paged/Phone     Ocrevus restarted at 40 ml/hr, increased by 40 ml/hr every 30 minutes as tolerated to max rate of 200 ml/hr. Total infusion time with pause = 3.5 hours    1 hour observation following infusion, per orders.     Intravenous Access:  Labs drawn without difficulty.  [facility] by [facility].    Treatment Conditions:  Biological Infusion Checklist:  ~~~ NOTE: If the patient answers yes to any of the questions below, hold the infusion and contact ordering provider or on-call provider.    Have you recently had an elevated temperature, fever, chills, productive cough, coughing for 3 weeks or longer or hemoptysis,  abnormal vital signs, night sweats,  chest pain or have you  noticed a decrease in your appetite, unexplained weight loss or fatigue? No  Do you have any open wounds or new incisions? No  Do you have any upcoming hospitalizations or surgeries? Does not include esophagogastroduodenoscopy, colonoscopy, endoscopic retrograde cholangiopancreatography (ERCP), endoscopic ultrasound (EUS), dental procedures or joint aspiration/steroid injections No  Do you currently have any signs of illness or infection or are you on any antibiotics? No  Have you had any new, sudden or worsening abdominal pain? No  Have you or anyone in your household received a live vaccination in the past 4 weeks? Please note: No live vaccines while on biologic/chemotherapy until 6 months after the last treatment. Patient can receive the flu vaccine (shot only), pneumovax and the Covid vaccine. It is optimal for the patient to get these vaccines mid cycle, but they can be given at any time as long as it is not on the day of the infusion. No  Have you recently been diagnosed with any new nervous system diseases (ie. Multiple sclerosis, Guillain [place], seizures, neurological changes) or cancer diagnosis? Are you on any form of radiation or chemotherapy? No  Are you pregnant or breast feeding or do you have plans of pregnancy in the future? No  Have you been having any signs of worsening depression or suicidal ideations?  (benlysta only) No  Have there been any other new onset medical symptoms? No  Have you had any new blood clots? (IVIG only) No    Administrations This Visit       acetaminophen (TYLENOL) tablet 650 mg       Admin Date  07/31/2024 Action  $Given Dose  650 mg Route  Oral Documented By  Elba Wallace, RN              diphenhydrAMINE (BENADRYL) 50 mg in sodium chloride 0.9 % 56 mL intermittent infusion       Admin Date  07/31/2024 Action  $New Bag Dose  50 mg Rate  336 mL/hr Route  Intravenous Documented By  Elba Wallace, RN              diphenhydrAMINE (BENADRYL) injection 50 mg       Admin  Date  07/31/2024 Action  $Given Dose  50 mg Route  Intravenous Documented By  Elba Wallace RN              methylPREDNISolone sodium succinate (solu-MEDROL) injection 125 mg       Admin Date  07/31/2024 Action  $Given Dose  125 mg Route  Intravenous Documented By  Elba Wallace RN               Admin Date  07/31/2024 Action  $Given Dose  125 mg Route  Intravenous Documented By  Elba Wallace RN              ocrelizumab (OCREVUS) 600 mg in sodium chloride 0.9 % 500 mL infusion       Admin Date  07/31/2024 Action  $New Bag Dose  600 mg Route  Intravenous Documented By  Elba Wallace RN               Admin Date  07/31/2024 Action  Restarted Dose   Route  Intravenous Documented By  Elba Wallace RN                  BP (!) 121/90 (BP Location: Left arm, Patient Position: Semi-Fish's, Cuff Size: Adult Large)   Pulse 87   Temp 98.1  F (36.7  C) (Oral)   Resp 18   Wt 148 kg (326 lb 3.2 oz)   SpO2 97%   BMI 51.09 kg/m      Post Infusion Assessment:  Patient tolerated infusion without incident.  Patient observed for 60 minutes post Ocrevus per protocol.  Blood return noted pre and post infusion.  Site patent and intact, free from redness, edema or discomfort.  No evidence of extravasations.  Access discontinued per protocol.       Discharge Plan:   Discharge instructions reviewed with: Patient.  Patient and/or family verbalized understanding of discharge instructions and all questions answered.  AVS to patient via "[employer]T.  Patient will return in 6 months for next appointment.   Patient discharged in stable condition accompanied by: .  Departure Mode: Ambulatory.      Elba Wallace RN

## 2024-10-11 ENCOUNTER — OFFICE VISIT (OUTPATIENT)
Dept: NEUROLOGY | Facility: CLINIC | Age: 34
End: 2024-10-11
Attending: PSYCHIATRY & NEUROLOGY
Payer: OTHER GOVERNMENT

## 2024-10-11 VITALS
BODY MASS INDEX: 51.65 KG/M2 | SYSTOLIC BLOOD PRESSURE: 129 MMHG | HEART RATE: 105 BPM | OXYGEN SATURATION: 98 % | WEIGHT: 293 LBS | DIASTOLIC BLOOD PRESSURE: 87 MMHG

## 2024-10-11 DIAGNOSIS — E66.01 CLASS 3 SEVERE OBESITY WITHOUT SERIOUS COMORBIDITY WITH BODY MASS INDEX (BMI) OF 50.0 TO 59.9 IN ADULT, UNSPECIFIED OBESITY TYPE (H): ICD-10-CM

## 2024-10-11 DIAGNOSIS — G35 MS (MULTIPLE SCLEROSIS) (H): Primary | ICD-10-CM

## 2024-10-11 DIAGNOSIS — M54.16 LUMBAR RADICULOPATHY: ICD-10-CM

## 2024-10-11 DIAGNOSIS — M54.12 CERVICAL RADICULOPATHY: ICD-10-CM

## 2024-10-11 DIAGNOSIS — E66.813 CLASS 3 SEVERE OBESITY WITHOUT SERIOUS COMORBIDITY WITH BODY MASS INDEX (BMI) OF 50.0 TO 59.9 IN ADULT, UNSPECIFIED OBESITY TYPE (H): ICD-10-CM

## 2024-10-11 PROCEDURE — 99214 OFFICE O/P EST MOD 30 MIN: CPT | Performed by: PSYCHIATRY & NEUROLOGY

## 2024-10-11 PROCEDURE — 99215 OFFICE O/P EST HI 40 MIN: CPT | Performed by: PSYCHIATRY & NEUROLOGY

## 2024-10-11 PROCEDURE — G0463 HOSPITAL OUTPT CLINIC VISIT: HCPCS | Performed by: PSYCHIATRY & NEUROLOGY

## 2024-10-11 RX ORDER — TIZANIDINE HYDROCHLORIDE 4 MG/1
4 CAPSULE, GELATIN COATED ORAL 3 TIMES DAILY PRN
Qty: 60 CAPSULE | Refills: 1 | Status: SHIPPED | OUTPATIENT
Start: 2024-10-11

## 2024-10-11 RX ORDER — GABAPENTIN 300 MG/1
300 CAPSULE ORAL 3 TIMES DAILY
Qty: 90 CAPSULE | Refills: 3 | Status: SHIPPED | OUTPATIENT
Start: 2024-10-11

## 2024-10-11 ASSESSMENT — PAIN SCALES - GENERAL: PAINLEVEL: SEVERE PAIN (6)

## 2024-10-11 NOTE — NURSING NOTE
Chief Complaint   Patient presents with    MS    RECHECK     6 month follow up      Vitals were taken and medications were reconciled.   Renny Chaudhari, EMT  10:50 AM

## 2024-10-11 NOTE — PROGRESS NOTES
Date of Service: 10/11/2024    [facility] Neurology   MS Clinic Evaluation    Subjective: 34-year-old woman with a history of hypothyroidism who presents for evaluation of multiple sclerosis.    Just about 1 month ago she had some tingling in the legs.  This is present for couple days and then seem to resolve.  However, she has been struggling with more symptoms in the last week or so.    She reports that 2-1/2 weeks ago she noticed a return of the weakness on her left side that she has experienced with multiple sclerosis in the past.  This was associated with some muscle spasms in the left hand and pain under her shoulder blade.  She has pain now under both shoulder blades that extends down into the arms.  She has some intermittent tingling in her fingertips that predominantly affects the middle 3 digits.  The pain is most intense at the base of the neck and into the back.  She believes that she was lifting some things that may have brought on this pain.    She also describes some radicular type pain in the right lower extremity.    She also experienced an infusion reaction with her last dose of Ocrevus.  She believes that the reaction occurred around the same time that past reactions have occurred, though the nurse had reported that it occurred a bit earlier.  In reviewing notes I see that she is not given Pepcid as a premedication, only IV Benadryl.  She was very interested in trying Ocrevus 1 more time.    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  Last relapse: Age 32, cognitive fog in the setting of MRI positive for active lesions    DMD hx:   Glatiramer 12/2021-12/2022, radiologic and clinical progression  Ocrevus 12/2/2022-present, LD 7/31/24    No Known Allergies    Current Outpatient Medications   Medication Sig Dispense Refill    levothyroxine (SYNTHROID/LEVOTHROID) 75 MCG tablet Take 75 mcg by mouth      ocrelizumab (OCREVUS) 300 MG/10ML SOLN injection Pt gets IV every 6  months for MS      vitamin D3 (CHOLECALCIFEROL) 50 mcg (2000 units) tablet Take 50 mcg by mouth daily       No current facility-administered medications for this visit.        Past medical, surgical, social and family history was personally reviewed. Pertinent details noted above.     Physical Examination:   /87 (BP Location: Right arm, Patient Position: Sitting, Cuff Size: Adult Large)   Pulse 105   Wt 149.6 kg (329 lb 12.8 oz)   SpO2 98%   BMI 51.65 kg/m      General: no acute distress  Cranial nerves:   VFFC  PERRL w/no RAPD  EOM full w/no YANCY   Face symmetric  Hearing intact  No dysarthria   Motor:   Tone is normal   Bulk is normal     R L  Deltoid  5 5  Biceps  5 5  Triceps 5 5  Wrist ext 5 5  Finger ext 5 5  Finger abd 5 5    Hip flexion 5 5  Knee flexion 5 5  Knee ext 5 5  Ankle d/f 5 5    Reflexes: 2+ and symmetric throughout, babinski absent bilaterally  Sensory: vibration is mildly reduced in the toes, JPS normal in the toes   Romberg is absent  Coordination: no ataxia or dysmetria  Gait: antalgic gait     Tests/Imaging:   CSF +ocb    Vitamin D 12  JCV Ab 0.58    ALC 1600-> 2100  [id number] -> 774  Cd19 4    MRI Brain  6/2023 - approx 5 PVL/DWML, dirty white matter, a bit of atrophy for age, gd-   4/2024-no new lesions    MRI Cervical spine   10/2022 -?upper cervical right dorsal cord lesion, images fairly grainy  4/2024-no lesions    MRI Thoracic spine   10/2022- couple mid thoracic lesions, gd-     Assessment: 34-year-old woman with relapsing remitting multiple sclerosis who is presenting with increased neck and back pain.    We discussed that the symptoms in the upper extremities are most consistent with a C6 radiculopathy.  She likely also has a right S1 radiculopathy.  I recommended she work with physical therapy.  Tizanidine and gabapentin have her recommended for symptomatic relief.    She is interested/motivated to work on weight loss.  A referral was provided to provide support.    She is  advised to continue with Ocrevus.  She did have a reaction with her last infusion.  I have recommended that she receive Pepcid before her next infusion.  If she continues to have reactions, then I would advise she switch to kesimpta.  She was in agreement.    Plan:     -PT  - Tizanidine, gabapentin  - Weight management  - Continue Ocrevus, due in January  - MRI in 6 months  - Follow-up after MRI    Note was completed with the assistance of Dragon Fluency software which can often result in accidental word substitutions.   The longitudinal plan of care for the diagnosis(es)/condition(s) as documented were addressed during this visit. Due to the added complexity in care, I will continue to support Vanessa in the subsequent management and with ongoing continuity of care.    A total of 40 minutes on the date of service were spent in the care of this patient.   Mohini Arshad MD on 10/11/2024 at 11:32 AM

## 2024-10-11 NOTE — LETTER
10/11/2024       RE: Vanessa Arreola  [id number]  [place] [address]     Dear Colleague,    Thank you for referring your patient, Vanessa Arreola, to the [facility] MULTIPLE SCLEROSIS CLINIC [place] at [facility]. Please see a copy of my visit note below.    Date of Service: 10/11/2024    [facility] Neurology   MS Clinic Evaluation    Subjective: 34-year-old woman with a history of hypothyroidism who presents for evaluation of multiple sclerosis.    Just about 1 month ago she had some tingling in the legs.  This is present for couple days and then seem to resolve.  However, she has been struggling with more symptoms in the last week or so.    She reports that 2-1/2 weeks ago she noticed a return of the weakness on her left side that she has experienced with multiple sclerosis in the past.  This was associated with some muscle spasms in the left hand and pain under her shoulder blade.  She has pain now under both shoulder blades that extends down into the arms.  She has some intermittent tingling in her fingertips that predominantly affects the middle 3 digits.  The pain is most intense at the base of the neck and into the back.  She believes that she was lifting some things that may have brought on this pain.    She also describes some radicular type pain in the right lower extremity.    She also experienced an infusion reaction with her last dose of Ocrevus.  She believes that the reaction occurred around the same time that past reactions have occurred, though the nurse had reported that it occurred a bit earlier.  In reviewing notes I see that she is not given Pepcid as a premedication, only IV Benadryl.  She was very interested in trying Ocrevus 1 more time.    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  Last relapse: Age 32, cognitive fog in the setting of MRI positive for active lesions    DMD hx:   Glatiramer  12/2021-12/2022, radiologic and clinical progression  Ocrevus 12/2/2022-present, LD 7/31/24    No Known Allergies    Current Outpatient Medications   Medication Sig Dispense Refill     levothyroxine (SYNTHROID/LEVOTHROID) 75 MCG tablet Take 75 mcg by mouth       ocrelizumab (OCREVUS) 300 MG/10ML SOLN injection Pt gets IV every 6 months for MS       vitamin D3 (CHOLECALCIFEROL) 50 mcg (2000 units) tablet Take 50 mcg by mouth daily       No current facility-administered medications for this visit.        Past medical, surgical, social and family history was personally reviewed. Pertinent details noted above.     Physical Examination:   /87 (BP Location: Right arm, Patient Position: Sitting, Cuff Size: Adult Large)   Pulse 105   Wt 149.6 kg (329 lb 12.8 oz)   SpO2 98%   BMI 51.65 kg/m      General: no acute distress  Cranial nerves:   VFFC  PERRL w/no RAPD  EOM full w/no YANCY   Face symmetric  Hearing intact  No dysarthria   Motor:   Tone is normal   Bulk is normal     R L  Deltoid  5 5  Biceps  5 5  Triceps 5 5  Wrist ext 5 5  Finger ext 5 5  Finger abd 5 5    Hip flexion 5 5  Knee flexion 5 5  Knee ext 5 5  Ankle d/f 5 5    Reflexes: 2+ and symmetric throughout, babinski absent bilaterally  Sensory: vibration is mildly reduced in the toes, JPS normal in the toes   Romberg is absent  Coordination: no ataxia or dysmetria  Gait: antalgic gait     Tests/Imaging:   CSF +ocb    Vitamin D 12  JCV Ab 0.58    ALC 1600-> 2100  [id number] -> 774  Cd19 4    MRI Brain  6/2023 - approx 5 PVL/DWML, dirty white matter, a bit of atrophy for age, gd-   4/2024-no new lesions    MRI Cervical spine   10/2022 -?upper cervical right dorsal cord lesion, images fairly grainy  4/2024-no lesions    MRI Thoracic spine   10/2022- couple mid thoracic lesions, gd-     Assessment: 34-year-old woman with relapsing remitting multiple sclerosis who is presenting with increased neck and back pain.    We discussed that the symptoms in the upper  extremities are most consistent with a C6 radiculopathy.  She likely also has a right S1 radiculopathy.  I recommended she work with physical therapy.  Tizanidine and gabapentin have her recommended for symptomatic relief.    She is interested/motivated to work on weight loss.  A referral was provided to provide support.    She is advised to continue with Ocrevus.  She did have a reaction with her last infusion.  I have recommended that she receive Pepcid before her next infusion.  If she continues to have reactions, then I would advise she switch to kesimpta.  She was in agreement.    Plan:     -PT  - Tizanidine, gabapentin  - Weight management  - Continue Ocrevus, due in January  - MRI in 6 months  - Follow-up after MRI    Note was completed with the assistance of Dragon Fluency software which can often result in accidental word substitutions.   The longitudinal plan of care for the diagnosis(es)/condition(s) as documented were addressed during this visit. Due to the added complexity in care, I will continue to support Vanessa in the subsequent management and with ongoing continuity of care.    A total of 40 minutes on the date of service were spent in the care of this patient.   Mohini Arshad MD on 10/11/2024 at 11:32 AM            Again, thank you for allowing me to participate in the care of your patient.      Sincerely,    Mohini Arshad MD

## 2024-10-11 NOTE — PATIENT INSTRUCTIONS
Neck and back pain   Pinched nerves  C6 in neck   S1 in back   Work with PT   Try tizanidine as needed (muscle relaxant)   Gabapentin prescribed for ner ve pain - ideally taken three times per day, but can make you sleepy, so you can take at night only if preferred     Referral to weight management     Ocrevus due in January   I will add pepcid to decrease risk of reaction     Mri in 6 months     See me after MRI

## 2024-10-13 RX ORDER — ALBUTEROL SULFATE 0.83 MG/ML
2.5 SOLUTION RESPIRATORY (INHALATION)
OUTPATIENT
Start: 2025-01-31

## 2024-10-13 RX ORDER — EPINEPHRINE 1 MG/ML
0.3 INJECTION, SOLUTION, CONCENTRATE INTRAVENOUS EVERY 5 MIN PRN
OUTPATIENT
Start: 2025-01-31

## 2024-10-13 RX ORDER — METHYLPREDNISOLONE SODIUM SUCCINATE 125 MG/2ML
125 INJECTION INTRAMUSCULAR; INTRAVENOUS ONCE
OUTPATIENT
Start: 2025-01-31

## 2024-10-13 RX ORDER — HEPARIN SODIUM (PORCINE) LOCK FLUSH IV SOLN 100 UNIT/ML 100 UNIT/ML
5 SOLUTION INTRAVENOUS
OUTPATIENT
Start: 2025-01-31

## 2024-10-13 RX ORDER — MEPERIDINE HYDROCHLORIDE 25 MG/ML
25 INJECTION INTRAMUSCULAR; INTRAVENOUS; SUBCUTANEOUS EVERY 30 MIN PRN
OUTPATIENT
Start: 2025-01-31

## 2024-10-13 RX ORDER — ALBUTEROL SULFATE 90 UG/1
1-2 INHALANT RESPIRATORY (INHALATION)
Start: 2025-01-31

## 2024-10-13 RX ORDER — ACETAMINOPHEN 325 MG/1
650 TABLET ORAL ONCE
OUTPATIENT
Start: 2025-01-31

## 2024-10-13 RX ORDER — DIPHENHYDRAMINE HYDROCHLORIDE 50 MG/ML
50 INJECTION INTRAMUSCULAR; INTRAVENOUS
Start: 2025-01-31

## 2024-10-13 RX ORDER — METHYLPREDNISOLONE SODIUM SUCCINATE 125 MG/2ML
125 INJECTION INTRAMUSCULAR; INTRAVENOUS
Start: 2025-01-31

## 2024-10-13 RX ORDER — HEPARIN SODIUM,PORCINE 10 UNIT/ML
5-20 VIAL (ML) INTRAVENOUS DAILY PRN
OUTPATIENT
Start: 2025-01-31

## 2024-10-13 RX ORDER — DIPHENHYDRAMINE HYDROCHLORIDE 50 MG/ML
50 INJECTION INTRAMUSCULAR; INTRAVENOUS ONCE
Start: 2025-01-31

## 2024-12-21 ENCOUNTER — HEALTH MAINTENANCE LETTER (OUTPATIENT)
Age: 34
End: 2024-12-21

## 2025-01-29 RX ORDER — EPINEPHRINE 1 MG/ML
0.3 INJECTION, SOLUTION, CONCENTRATE INTRAVENOUS EVERY 5 MIN PRN
OUTPATIENT
Start: 2025-01-31

## 2025-01-29 RX ORDER — DIPHENHYDRAMINE HYDROCHLORIDE 50 MG/ML
50 INJECTION INTRAMUSCULAR; INTRAVENOUS
Start: 2025-01-31

## 2025-01-29 RX ORDER — METHYLPREDNISOLONE SODIUM SUCCINATE 40 MG/ML
40 INJECTION INTRAMUSCULAR; INTRAVENOUS
Start: 2025-01-31

## 2025-01-29 RX ORDER — DIPHENHYDRAMINE HYDROCHLORIDE 50 MG/ML
25 INJECTION INTRAMUSCULAR; INTRAVENOUS
Start: 2025-01-31

## 2025-01-29 RX ORDER — MEPERIDINE HYDROCHLORIDE 25 MG/ML
25 INJECTION INTRAMUSCULAR; INTRAVENOUS; SUBCUTANEOUS
OUTPATIENT
Start: 2025-01-31

## 2025-01-29 RX ORDER — ALBUTEROL SULFATE 90 UG/1
1-2 INHALANT RESPIRATORY (INHALATION)
Start: 2025-01-31

## 2025-01-29 RX ORDER — ALBUTEROL SULFATE 0.83 MG/ML
2.5 SOLUTION RESPIRATORY (INHALATION)
OUTPATIENT
Start: 2025-01-31

## 2025-02-03 ENCOUNTER — INFUSION THERAPY VISIT (OUTPATIENT)
Dept: INFUSION THERAPY | Facility: CLINIC | Age: 35
End: 2025-02-03
Attending: INTERNAL MEDICINE
Payer: COMMERCIAL

## 2025-02-03 VITALS
TEMPERATURE: 97.9 F | RESPIRATION RATE: 18 BRPM | HEART RATE: 96 BPM | OXYGEN SATURATION: 97 % | HEIGHT: 67 IN | WEIGHT: 293 LBS | DIASTOLIC BLOOD PRESSURE: 77 MMHG | BODY MASS INDEX: 45.99 KG/M2 | SYSTOLIC BLOOD PRESSURE: 127 MMHG

## 2025-02-03 DIAGNOSIS — G35 MS (MULTIPLE SCLEROSIS) (H): Primary | ICD-10-CM

## 2025-02-03 PROCEDURE — 96376 TX/PRO/DX INJ SAME DRUG ADON: CPT

## 2025-02-03 PROCEDURE — 258N000003 HC RX IP 258 OP 636: Performed by: PSYCHIATRY & NEUROLOGY

## 2025-02-03 PROCEDURE — 250N000013 HC RX MED GY IP 250 OP 250 PS 637: Performed by: PSYCHIATRY & NEUROLOGY

## 2025-02-03 PROCEDURE — 250N000011 HC RX IP 250 OP 636: Performed by: PSYCHIATRY & NEUROLOGY

## 2025-02-03 PROCEDURE — 96375 TX/PRO/DX INJ NEW DRUG ADDON: CPT

## 2025-02-03 PROCEDURE — 96366 THER/PROPH/DIAG IV INF ADDON: CPT

## 2025-02-03 PROCEDURE — 96365 THER/PROPH/DIAG IV INF INIT: CPT

## 2025-02-03 RX ORDER — ALBUTEROL SULFATE 90 UG/1
1-2 INHALANT RESPIRATORY (INHALATION)
Start: 2025-08-02

## 2025-02-03 RX ORDER — HEPARIN SODIUM,PORCINE 10 UNIT/ML
5-20 VIAL (ML) INTRAVENOUS DAILY PRN
OUTPATIENT
Start: 2025-08-02

## 2025-02-03 RX ORDER — METHYLPREDNISOLONE SODIUM SUCCINATE 125 MG/2ML
125 INJECTION INTRAMUSCULAR; INTRAVENOUS ONCE
Status: COMPLETED | OUTPATIENT
Start: 2025-02-03 | End: 2025-02-03

## 2025-02-03 RX ORDER — MEPERIDINE HYDROCHLORIDE 25 MG/ML
25 INJECTION INTRAMUSCULAR; INTRAVENOUS; SUBCUTANEOUS
OUTPATIENT
Start: 2025-08-02

## 2025-02-03 RX ORDER — ACETAMINOPHEN 325 MG/1
650 TABLET ORAL ONCE
Status: COMPLETED | OUTPATIENT
Start: 2025-02-03 | End: 2025-02-03

## 2025-02-03 RX ORDER — DIPHENHYDRAMINE HYDROCHLORIDE 50 MG/ML
50 INJECTION INTRAMUSCULAR; INTRAVENOUS ONCE
Status: COMPLETED | OUTPATIENT
Start: 2025-02-03 | End: 2025-02-03

## 2025-02-03 RX ORDER — DIPHENHYDRAMINE HYDROCHLORIDE 50 MG/ML
50 INJECTION INTRAMUSCULAR; INTRAVENOUS ONCE
Start: 2025-08-02 | End: 2025-08-02

## 2025-02-03 RX ORDER — HEPARIN SODIUM (PORCINE) LOCK FLUSH IV SOLN 100 UNIT/ML 100 UNIT/ML
5 SOLUTION INTRAVENOUS
OUTPATIENT
Start: 2025-08-02

## 2025-02-03 RX ORDER — ACETAMINOPHEN 325 MG/1
650 TABLET ORAL ONCE
OUTPATIENT
Start: 2025-08-02

## 2025-02-03 RX ORDER — ALBUTEROL SULFATE 0.83 MG/ML
2.5 SOLUTION RESPIRATORY (INHALATION)
OUTPATIENT
Start: 2025-08-02

## 2025-02-03 RX ORDER — EPINEPHRINE 1 MG/ML
0.3 INJECTION, SOLUTION INTRAMUSCULAR; SUBCUTANEOUS EVERY 5 MIN PRN
OUTPATIENT
Start: 2025-08-02

## 2025-02-03 RX ORDER — METHYLPREDNISOLONE SODIUM SUCCINATE 125 MG/2ML
125 INJECTION INTRAMUSCULAR; INTRAVENOUS ONCE
OUTPATIENT
Start: 2025-08-02

## 2025-02-03 RX ORDER — DIPHENHYDRAMINE HYDROCHLORIDE 50 MG/ML
50 INJECTION INTRAMUSCULAR; INTRAVENOUS
Start: 2025-08-02

## 2025-02-03 RX ORDER — DIPHENHYDRAMINE HYDROCHLORIDE 50 MG/ML
25 INJECTION INTRAMUSCULAR; INTRAVENOUS
Start: 2025-08-02

## 2025-02-03 RX ORDER — DIPHENHYDRAMINE HYDROCHLORIDE 50 MG/ML
25 INJECTION INTRAMUSCULAR; INTRAVENOUS
Status: COMPLETED | OUTPATIENT
Start: 2025-02-03 | End: 2025-02-03

## 2025-02-03 RX ORDER — METHYLPREDNISOLONE SODIUM SUCCINATE 40 MG/ML
40 INJECTION INTRAMUSCULAR; INTRAVENOUS
Start: 2025-08-02

## 2025-02-03 RX ADMIN — DIPHENHYDRAMINE HYDROCHLORIDE 50 MG: 50 INJECTION INTRAMUSCULAR; INTRAVENOUS at 07:41

## 2025-02-03 RX ADMIN — OCRELIZUMAB 600 MG: 300 INJECTION INTRAVENOUS at 08:01

## 2025-02-03 RX ADMIN — DIPHENHYDRAMINE HYDROCHLORIDE 25 MG: 50 INJECTION INTRAMUSCULAR; INTRAVENOUS at 09:06

## 2025-02-03 RX ADMIN — ACETAMINOPHEN 650 MG: 325 TABLET ORAL at 07:41

## 2025-02-03 RX ADMIN — FAMOTIDINE 20 MG: 10 INJECTION INTRAVENOUS at 07:44

## 2025-02-03 RX ADMIN — METHYLPREDNISOLONE SODIUM SUCCINATE 125 MG: 125 INJECTION, POWDER, FOR SOLUTION INTRAMUSCULAR; INTRAVENOUS at 07:43

## 2025-02-03 NOTE — PROGRESS NOTES
Chief Complaint   Patient presents with    Infusion     IV Ocrevus     Infusion Nursing Note:  Vanessa Arreola presents today for IV Ocrevus, every 180 days.    Patient seen by provider today: No   present during visit today: Not Applicable.    Note:   Premeds administered per orders. Pepcid added to therapy plan due to history of reaction.   Ocrevus started at 40 mL/hr and increased by 40 mL/hr every 30 minutes to a max of 200 mL/hr. Total infusion time 4 hours.  0900- 1 hour into infusion (start of rate 120 ml/hr- 63.4 ml administered) patient c/o itching throat and ears. Infusion stopped and 25 mg IV benadryl administered with improvement of symptoms within 5 minutes. Vitals remained stable. See flowsheets. Dr. Arshad paged. Per Dr. Arshad: restart Ocrevus at 80 ml/hr x 30 minutes and if she tolerates, can proceed with normal rates.    Patient tolerated remainder of infusion and observation well.     Intravenous Access:  Peripheral IV placed by [facility] RN.  No labs due.     Treatment Conditions:  Biological Infusion Checklist:  ~~~ NOTE: If the patient answers yes to any of the questions below, hold the infusion and contact ordering provider or on-call provider.    Have you recently had an elevated temperature, fever, chills, productive cough, coughing for 3 weeks or longer or hemoptysis,  abnormal vital signs, night sweats,  chest pain or have you noticed a decrease in your appetite, unexplained weight loss or fatigue? No  Do you have any open wounds or new incisions? No  Do you have any upcoming hospitalizations or surgeries? Does not include esophagogastroduodenoscopy, colonoscopy, endoscopic retrograde cholangiopancreatography (ERCP), endoscopic ultrasound (EUS), dental procedures or joint aspiration/steroid injections No  Do you currently have any signs of illness or infection or are you on any antibiotics? No, completed abx for sinus infection mid January. Sx resolved.  Have you had any new, sudden or  worsening abdominal pain? No  Have you or anyone in your household received a live vaccination in the past 4 weeks? Please note: No live vaccines while on biologic/chemotherapy until 6 months after the last treatment. Patient can receive the flu vaccine (shot only), pneumovax and the Covid vaccine. It is optimal for the patient to get these vaccines mid cycle, but they can be given at any time as long as it is not on the day of the infusion. No  Have you recently been diagnosed with any new nervous system diseases (ie. Multiple sclerosis, Guillain [place], seizures, neurological changes) or cancer diagnosis? Are you on any form of radiation or chemotherapy? No  Are you pregnant or breast feeding or do you have plans of pregnancy in the future? No  Have you been having any signs of worsening depression or suicidal ideations?  (benlysta only) No  Have there been any other new onset medical symptoms? No  Have you had any new blood clots? (IVIG only) No    Post Infusion Assessment:  Patient tolerated infusion poorly due to : Hypersensitivity: Did patient have a hypersensitivity reaction? : Yes  Drug or Product name: Ocrevus  Were pre-meds administered?: Yes  What pre-meds were administered?: Acetaminophen (Tylenol), Diphenhydramine (Benadryl), Famotidine (Pepcid), Methylprednisolone  First or Subsequent treatment: Subsequent infusion  Rate of infusion when patient had hypersensitivity reaction: 120 ml/hr (just started this step)  Time the hypersensitivity reaction was first recognized: 0900  Symptoms observed or reported (select all that apply): Itching  Interventions/treatment following reaction: Infusion stopped, Hypersensitivity medications administered  What hypersensitivity medications were administered?: DiphendydrAMINE (benadryl) (25 mg)  Name of provider notified: Dr. DORIAN Arshad  Time provider notified: 0910  Type of notification (select all that apply): Paged/Phone (Secure Chat)  Was the patient re-challenged  today?: Yes - tolerated well    Patient observed for 60 minutes post Ocrevus per protocol.  Blood return noted pre and post infusion.  Site patent and intact, free from redness, edema or discomfort.  No evidence of extravasations.  Access discontinued per protocol.     POST-INFUSION OF BIOLOGICAL MEDICATION:  Reviewed with patient.  Given biologic medication or medication hand-out. Inform patient if any fever, chills or signs of infection, new symptoms, abdominal pain, heart palpitations, shortness of breath, reaction, weakness, neurological changes, seek medical attention immediately and should not receive infusions. No live virus vaccines prior to or during treatment or up to 6 months post infusion. If the patient has an upcoming procedure or surgery, this should be discussed with the rheumatologist and surgeon or provider.    Discharge Plan:   AVS to patient via MYCHART.  Patient will follow up with ordering provider.  Patient discharged in stable condition accompanied by: .  Departure Mode: Ambulatory.    Administrations This Visit       acetaminophen (TYLENOL) tablet 650 mg       Admin Date  02/03/2025 Action  $Given Dose  650 mg Route  Oral Documented By  Mehran Maya RN              diphenhydrAMINE (BENADRYL) injection 25 mg       Admin Date  02/03/2025 Action  $Given Dose  25 mg Route  Intravenous Documented By  Mehran Maya RN              diphenhydrAMINE (BENADRYL) injection 50 mg       Admin Date  02/03/2025 Action  $Given Dose  50 mg Route  Intravenous Documented By  Mehran Maya RN              famotidine (PEPCID) injection 20 mg       Admin Date  02/03/2025 Action  $Given Dose  20 mg Route  Intravenous Documented By  Mehran Maya RN              methylPREDNISolone Na Suc (solu-MEDROL) injection 125 mg       Admin Date  02/03/2025 Action  $Given Dose  125 mg Route  Intravenous Documented By  Mehran Maya RN              ocrelizumab (OCREVUS) 600 mg in sodium chloride 0.9 % 500  "mL infusion       Admin Date  02/03/2025 Action  $New Bag Dose  600 mg Rate   Route  Intravenous Documented By  Mehran Maya RN               Admin Date  02/03/2025 Action  Restarted Dose   Rate  80 mL/hr Route  Intravenous Documented By  Mehran Maya, RN                    Vital signs:  Temp: 98.1  F (36.7  C) Temp src: Oral BP: 118/68 Pulse: 106   Resp: 18 SpO2: 97 % O2 Device: None (Room air)   Height: 170.2 cm (5' 7\") Weight: (!) 149.3 kg (329 lb 3.2 oz)  Estimated body mass index is 51.56 kg/m  as calculated from the following:    Height as of this encounter: 1.702 m (5' 7\").    Weight as of this encounter: 149.3 kg (329 lb 3.2 oz).          "

## 2025-02-25 ENCOUNTER — DOCUMENTATION ONLY (OUTPATIENT)
Dept: NEUROLOGY | Facility: CLINIC | Age: 35
End: 2025-02-25
Payer: COMMERCIAL

## 2025-02-25 NOTE — PROGRESS NOTES
Authorization for Ocrelizumab has been received from [facility], approval valid from 01/30/2025 through 04/30/2025.  Renny Chaudhari EMT February 25, 2025

## 2025-04-11 ENCOUNTER — ANCILLARY PROCEDURE (OUTPATIENT)
Dept: MRI IMAGING | Facility: CLINIC | Age: 35
End: 2025-04-11
Attending: PSYCHIATRY & NEUROLOGY
Payer: COMMERCIAL

## 2025-04-11 DIAGNOSIS — M54.12 CERVICAL RADICULOPATHY: ICD-10-CM

## 2025-04-11 PROCEDURE — 72156 MRI NECK SPINE W/O & W/DYE: CPT | Performed by: RADIOLOGY

## 2025-04-11 PROCEDURE — 72157 MRI CHEST SPINE W/O & W/DYE: CPT | Performed by: RADIOLOGY

## 2025-04-11 PROCEDURE — A9585 GADOBUTROL INJECTION: HCPCS | Mod: JZ | Performed by: RADIOLOGY

## 2025-04-11 RX ORDER — GADOBUTROL 604.72 MG/ML
15 INJECTION INTRAVENOUS ONCE
Status: COMPLETED | OUTPATIENT
Start: 2025-04-11 | End: 2025-04-11

## 2025-04-11 RX ADMIN — GADOBUTROL 15 ML: 604.72 INJECTION INTRAVENOUS at 14:40

## 2025-04-11 NOTE — DISCHARGE INSTRUCTIONS
MRI Contrast Discharge Instructions    The IV contrast you received today will pass out of your body in your  urine. This will happen in the next 24 hours. You will not feel this process.  Your urine will not change color.    Drink at least 4 extra glasses of water or juice today (unless your doctor  has restricted your fluids). This reduces the stress on your kidneys.  You may take your regular medicines.    If you are on dialysis: It is best to have dialysis today.    If you have a reaction: Most reactions happen right away. If you have  any new symptoms after leaving the hospital (such as hives or swelling),  call your hospital at the correct number below. Or call your family doctor.  If you have breathing distress or wheezing, call 911.    Special instructions: ***    I have read and understand the above information.    Signature:______________________________________ Date:___________    Staff:__________________________________________ Date:___________     Time:__________    [facility] Departments:    ___[place]: [phone]  ___[facility]: [phone]  ___[place]: [phone] ___[facility]: [phone]  ___[facility]: [phone]  ___[facility]: [phone]  ___[phone]  ___[facility]: [phone]  ___[phone]

## 2025-04-16 ENCOUNTER — OFFICE VISIT (OUTPATIENT)
Dept: NEUROLOGY | Facility: CLINIC | Age: 35
End: 2025-04-16
Attending: PSYCHIATRY & NEUROLOGY
Payer: COMMERCIAL

## 2025-04-16 VITALS
BODY MASS INDEX: 45.99 KG/M2 | WEIGHT: 293 LBS | HEART RATE: 80 BPM | SYSTOLIC BLOOD PRESSURE: 134 MMHG | DIASTOLIC BLOOD PRESSURE: 92 MMHG | OXYGEN SATURATION: 97 % | HEIGHT: 67 IN

## 2025-04-16 DIAGNOSIS — G43.009 MIGRAINE WITHOUT AURA AND WITHOUT STATUS MIGRAINOSUS, NOT INTRACTABLE: ICD-10-CM

## 2025-04-16 DIAGNOSIS — Z51.81 THERAPEUTIC DRUG MONITORING: ICD-10-CM

## 2025-04-16 DIAGNOSIS — G35 MS (MULTIPLE SCLEROSIS) (H): Primary | ICD-10-CM

## 2025-04-16 PROCEDURE — G0463 HOSPITAL OUTPT CLINIC VISIT: HCPCS | Performed by: PSYCHIATRY & NEUROLOGY

## 2025-04-16 RX ORDER — SUMATRIPTAN 50 MG/1
50 TABLET, FILM COATED ORAL
Qty: 9 TABLET | Refills: 3 | Status: SHIPPED | OUTPATIENT
Start: 2025-04-16

## 2025-04-16 ASSESSMENT — PAIN SCALES - GENERAL: PAINLEVEL_OUTOF10: NO PAIN (0)

## 2025-04-16 NOTE — LETTER
4/16/2025       RE: Vanessa Arreola  [id number]  [place] [address]     Dear Colleague,    Thank you for referring your patient, Vanessa Arreola, to the [facility] MULTIPLE SCLEROSIS CLINIC [place] at . Please see a copy of my visit note below.    Date of Service: 4/16/2025    [facility] Neurology   MS Clinic Evaluation    Subjective: 34-year-old woman with a history of hypothyroidism who presents for evaluation of multiple sclerosis.    She does not report any discrete new symptoms, but did suffer from an increase in symptoms in early March.  Around that time she was having headache pain in the frontal/facial/maxillary region.  It felt as though she had a sinus infection.  She had significant light sensitivity.  She had worsening of visual snow.  All of her symptoms generally got worse.  She denied nausea.  She presented to urgent care then was referred to the ER.  They gave her Compazine, but she suffered akathisia as a side effect.    The facial symptoms have persisted for approximately 2 weeks.  She has had something similar in the past, though this was previously attributable to a sinus infection.    She is open to trying a migraine medication.    She has been working out and notes that some of her chronic symptoms have improved a bit.    She received Ocrevus on February 3.  She did have a recurrent reaction about 1 hour into the infusion that required additional Benadryl despite getting IV Benadryl and famotidine prior to the infusion.  She is open to switching to kesimpta which we have previously discussed.    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  Last relapse: Age 32, cognitive fog in the setting of MRI positive for active lesions    DMD hx:   Glatiramer 12/2021-12/2022, radiologic and clinical progression  Ocrevus 12/2/2022-present, LD 2/3/25    No Known Allergies    Current Outpatient Medications  "  Medication Sig Dispense Refill     levothyroxine (SYNTHROID/LEVOTHROID) 75 MCG tablet Take 75 mcg by mouth       ocrelizumab (OCREVUS) 300 MG/10ML SOLN injection Pt gets IV every 6 months for MS       tiZANidine (ZANAFLEX) 4 MG capsule Take 1 capsule (4 mg) by mouth 3 times daily as needed for muscle spasms. 60 capsule 1     vitamin D3 (CHOLECALCIFEROL) 50 mcg (2000 units) tablet Take 50 mcg by mouth daily       gabapentin (NEURONTIN) 300 MG capsule Take 1 capsule (300 mg) by mouth 3 times daily. (Patient not taking: Reported on 4/16/2025) 90 capsule 3     No current facility-administered medications for this visit.        Past medical, surgical, social and family history was personally reviewed. Pertinent details noted above.     Physical Examination:   BP (!) 134/92 (BP Location: Right arm, Patient Position: Sitting, Cuff Size: Adult Large)   Pulse 80   Ht 1.706 m (5' 7.17\")   Wt (!) 146.1 kg (322 lb 3.2 oz)   SpO2 97%   BMI 50.22 kg/m      General: no acute distress  Cranial nerves:   VFFC  PERRL w/no RAPD  EOM full w/no YANCY   Face symmetric  Hearing intact  No dysarthria   Motor:   Tone is normal   Bulk is normal     R L  Deltoid  5 5  Biceps  5 5  Triceps 5 5  Wrist ext 5 5  Finger ext 5 5  Finger abd 5 5    Hip flexion 5 5  Knee flexion 5 5  Knee ext 5 5  Ankle d/f 5 5    Reflexes: 2+ and symmetric throughout, babinski absent bilaterally  Sensory: vibration is mildly reduced in the toes, JPS normal in the toes   Romberg is absent  Coordination: no ataxia or dysmetria  Gait: Normal base and stride, tandem gait intact, able to balance on 1 foot and hop 5 times    Tests/Imaging:   CSF +ocb    Vitamin D 12  JCV Ab 0.58    ALC 1600-> 2100  [id number] -> 774  Cd19 4    MRI Brain  6/2023 - approx 5 PVL/DWML, dirty white matter, a bit of atrophy for age, gd-   4/2024-no new lesions  4/2025-no new lesions, gd- (7T)    MRI Cervical spine   10/2022 -?upper cervical right dorsal cord lesion, images fairly " grainy  4/2024-no lesions  4/2025-no new lesions, gd-     MRI Thoracic spine   10/2022- couple mid thoracic lesions, gd-  4/2025-no new lesions, gd-      Assessment: 34-year-old woman with relapsing remitting multiple sclerosis who recently had an episode of increased symptoms.  This is in the setting of facial/maxillary pain.  I suspect that symptoms are related to a migraine.  I recommended a trial of sumatriptan should she experience recurrence.  Common risks and side effects of this medication were discussed.    Compazine was placed on her allergy list that she had akathisia as a side effect.  The symptoms were quite intense, so she desires to not receive this medication again.    Given the ongoing reactions to Ocrevus I recommended switching to kesimpta.  She is advised to start approximately 5 months since her last dose of Ocrevus.  Referral to [facility] pharmacy was placed.    Plan:     -Switch to kesimpta  - Blood work to be done within the next month  - Sumatriptan trial  - Follow-up in 6 months    Note was completed with the assistance of Dragon Fluency software which can often result in accidental word substitutions.   The longitudinal plan of care for the diagnosis(es)/condition(s) as documented were addressed during this visit. Due to the added complexity in care, I will continue to support Vanessa in the subsequent management and with ongoing continuity of care.    A total of 30 minutes on the date of service were spent in the care of this patient.   Mohini Arshad MD on 4/16/2025 at 2:53 PM              Again, thank you for allowing me to participate in the care of your patient.      Sincerely,    Mohini Arshad MD

## 2025-04-16 NOTE — PATIENT INSTRUCTIONS
I suspect you were having a migraine   If you have recurrence try sumatriptan as needed    Your MRI brain and spinal cord were stable     Switch to kesimpta - aim to start in July     Follow up in 6 months

## 2025-04-16 NOTE — NURSING NOTE
Chief Complaint   Patient presents with    MS    RECHECK     MS follow up      Vitals were taken and medications were reconciled.    Renny Chaudhari, EMT  2:38 PM

## 2025-04-16 NOTE — PROGRESS NOTES
Date of Service: 4/16/2025    [facility] Neurology   MS Clinic Evaluation    Subjective: 34-year-old woman with a history of hypothyroidism who presents for evaluation of multiple sclerosis.    She does not report any discrete new symptoms, but did suffer from an increase in symptoms in early March.  Around that time she was having headache pain in the frontal/facial/maxillary region.  It felt as though she had a sinus infection.  She had significant light sensitivity.  She had worsening of visual snow.  All of her symptoms generally got worse.  She denied nausea.  She presented to urgent care then was referred to the ER.  They gave her Compazine, but she suffered akathisia as a side effect.    The facial symptoms have persisted for approximately 2 weeks.  She has had something similar in the past, though this was previously attributable to a sinus infection.    She is open to trying a migraine medication.    She has been working out and notes that some of her chronic symptoms have improved a bit.    She received Ocrevus on February 3.  She did have a recurrent reaction about 1 hour into the infusion that required additional Benadryl despite getting IV Benadryl and famotidine prior to the infusion.  She is open to switching to kesimpta which we have previously discussed.    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 60 capsule 1  "   vitamin D3 (CHOLECALCIFEROL) 50 mcg (2000 units) tablet Take 50 mcg by mouth daily      gabapentin (NEURONTIN) 300 MG capsule Take 1 capsule (300 mg) by mouth 3 times daily. (Patient not taking: Reported on 4/16/2025) 90 capsule 3     No current facility-administered medications for this visit.        Past medical, surgical, social and family history was personally reviewed. Pertinent details noted above.     Physical Examination:   BP (!) 134/92 (BP Location: Right arm, Patient Position: Sitting, Cuff Size: Adult Large)   Pulse 80   Ht 1.706 m (5' 7.17\")   Wt (!) 146.1 kg (322 lb 3.2 oz)   SpO2 97%   BMI 50.22 kg/m      General: no acute distress  Cranial nerves:   VFFC  PERRL w/no RAPD  EOM full w/no YANCY   Face symmetric  Hearing intact  No dysarthria   Motor:   Tone is normal   Bulk is normal     R L  Deltoid  5 5  Biceps  5 5  Triceps 5 5  Wrist ext 5 5  Finger ext 5 5  Finger abd 5 5    Hip flexion 5 5  Knee flexion 5 5  Knee ext 5 5  Ankle d/f 5 5    Reflexes: 2+ and symmetric throughout, babinski absent bilaterally  Sensory: vibration is mildly reduced in the toes, JPS normal in the toes   Romberg is absent  Coordination: no ataxia or dysmetria  Gait: Normal base and stride, tandem gait intact, able to balance on 1 foot and hop 5 times    Tests/Imaging:   CSF +ocb    Vitamin D 12  JCV Ab 0.58    ALC 1600-> 2100  HKX9306 -> 774  Cd19 4    MRI Brain  6/2023 - approx 5 PVL/DWML, dirty white matter, a bit of atrophy for age, gd-   4/2024-no new lesions  4/2025-no new lesions, gd- (7T)    MRI Cervical spine   10/2022 -?upper cervical right dorsal cord lesion, images fairly grainy  4/2024-no lesions  4/2025-no new lesions, gd-     MRI Thoracic spine   10/2022- couple mid thoracic lesions, gd-  4/2025-no new lesions, gd-      Assessment: 34-year-old woman with relapsing remitting multiple sclerosis who recently had an episode of increased symptoms.  This is in the setting of facial/maxillary pain.  I suspect " that symptoms are related to a migraine.  I recommended a trial of sumatriptan should she experience recurrence.  Common risks and side effects of this medication were discussed.    Compazine was placed on her allergy list that she had akathisia as a side effect.  The symptoms were quite intense, so she desires to not receive this medication again.    Given the ongoing reactions to Ocrevus I recommended switching to kesimpta.  She is advised to start approximately 5 months since her last dose of Ocrevus.  Referral to [facility] pharmacy was placed.    Plan:     -Switch to kesimpta  - Blood work to be done within the next month  - Sumatriptan trial  - Follow-up in 6 months    Note was completed with the assistance of Dragon Fluency software which can often result in accidental word substitutions.   The longitudinal plan of care for the diagnosis(es)/condition(s) as documented were addressed during this visit. Due to the added complexity in care, I will continue to support Vanessa in the subsequent management and with ongoing continuity of care.    A total of 30 minutes on the date of service were spent in the care of this patient.   Mohini Arshad MD on 4/16/2025 at 2:53 PM

## 2025-04-21 ENCOUNTER — TELEPHONE (OUTPATIENT)
Dept: NEUROLOGY | Facility: CLINIC | Age: 35
End: 2025-04-21
Payer: COMMERCIAL

## 2025-04-21 NOTE — TELEPHONE ENCOUNTER
MTM referral from: [facility] visit (referral by provider)    MTM referral outreach attempt #2 on April 21, 2025 at 12:40 PM      Outcome: Patient not reachable after several attempts, routed to Pharmacist Team/Provider as an FYI    Use HB for the carrier/Plan on the flowsheet      moziy Message Sent    [place]

## 2025-06-25 ENCOUNTER — TELEPHONE (OUTPATIENT)
Dept: NEUROLOGY | Facility: CLINIC | Age: 35
End: 2025-06-25

## 2025-06-25 ENCOUNTER — VIRTUAL VISIT (OUTPATIENT)
Dept: PHARMACY | Facility: CLINIC | Age: 35
End: 2025-06-25
Attending: PSYCHIATRY & NEUROLOGY
Payer: COMMERCIAL

## 2025-06-25 DIAGNOSIS — G35 MS (MULTIPLE SCLEROSIS) (H): Primary | ICD-10-CM

## 2025-06-25 DIAGNOSIS — E03.9 HYPOTHYROIDISM: ICD-10-CM

## 2025-06-25 DIAGNOSIS — G43.909 MIGRAINES: ICD-10-CM

## 2025-06-25 RX ORDER — OFATUMUMAB 20 MG/.4ML
20 INJECTION, SOLUTION SUBCUTANEOUS WEEKLY
Qty: 1.2 ML | Refills: 0 | OUTPATIENT
Start: 2025-06-25 | End: 2025-07-10

## 2025-06-25 RX ORDER — CYCLOBENZAPRINE HCL 5 MG
5 TABLET ORAL 3 TIMES DAILY PRN
COMMUNITY
Start: 2025-01-02

## 2025-06-25 RX ORDER — OFATUMUMAB 20 MG/.4ML
20 INJECTION, SOLUTION SUBCUTANEOUS
Qty: 0.4 ML | Refills: 11 | OUTPATIENT
Start: 2025-06-25

## 2025-06-25 RX ORDER — LEVOTHYROXINE SODIUM 88 UG/1
88 TABLET ORAL DAILY
COMMUNITY
Start: 2024-12-17

## 2025-06-25 NOTE — PATIENT INSTRUCTIONS
Recommendations from today's MTM visit:                                                    MTM (medication therapy management) is a service provided by a clinical pharmacist designed to help you get the most of out of your medicines.      Medication list updated and reviewed   Please get labs drawn as soon as possible per Dr. Arshad's recommendations   Remember the first 3 doses of Kesimpta are weekly. You will skip week 4. Week 5 is the first of the monthly maintenance doses. Here is a link to watch on how to use the pen:     https://[email or web address].[employer]/taking-kesimpta?utm_source=oscar&utm_medium=[facility]&utm_campaign=316836-316839-Kesimpta.com-DTC-Branded;S;PH;BR;NER;DTC;BR&utm_content=Dosing-Exact&utm_term=kesimpta%20prescribing%20information&[email or web address]&gclsrc=3p.[id number]        Please store Kesimpta in the refrigerator. If needed, it can be stored at room temperature for 7 days then returned to the fridge to be used within 7 days. If not used within 7 days of returning to the refrigerator, the medication needs to be discarded.     Contact either myself or the neurology clinic if you are sick and due for a Kesimpta injection as it may be necessary to hold the dose until you are feeling better.      Follow-up:   Appointments in Next Year      Jul 16, 2025 12:00 PM  (Arrive by 11:45 AM)  Spine Evaluation with Lizy Baer PT  [facility] [place] ([facility]) [phone]     Aug 20, 2025 10:00 AM  Pharmacist Visit with Alexia Devlin [facility] ([facility] [place] ) [phone]     Oct 17, 2025 1:00 PM  (Arrive by 12:45 PM)  Return MS with Mohini Arshad MD  [facility] [place] ([facility] ) [phone]            It was great speaking with you today.  I value  "your experience and would be very thankful for your time in providing feedback in our clinic survey. In the next few days, you may receive an email or text message from [facility] [employer] with a link to a survey related to your  clinical pharmacist.\"     To schedule another MTM appointment, please call the clinic directly or you may call the MTM scheduling line at [phone].    My Clinical Pharmacist's contact information:                                                      Please feel free to contact me with any questions or concerns you have.      Alexia Devlin, Pharm.D., MPH  Medication Therapy Management Pharmacist   [facility]   "

## 2025-06-25 NOTE — PROGRESS NOTES
Medication Therapy Management (MTM) Encounter    ASSESSMENT:                            Medication Adherence/Access: No issues identified.    MS:  Reminded patient to have labs drawn which were ordered previously by neurologist. All patient's questions were answered. Education provided to the patient on medication dosing, administration, storage, potential side effects, and the need to contact the neurology clinic or MTM pharmacist if sick when they are due for Kesimpta injection. Prescription sent to [facility].      Headache   Migraine:   No changes recommended.      Hypothyroidism   No changes recommended.      PLAN:                            Medication list updated and reviewed   Please get labs drawn as soon as possible per Dr. Arshad's recommendations   Remember the first 3 doses of Kesimpta are weekly. You will skip week 4. Week 5 is the first of the monthly maintenance doses. Here is a link to watch on how to use the pen:     https://www.[employer]/taking-kesimpta?utm_source=oscar&utm_medium=[facility]&utm_campaign=316836-316839-Kesimpta.com-DTC-Branded;S;PH;BR;NER;DTC;BR&utm_content=Dosing-Exact&utm_term=kesimpta%20prescribing%20information&[id number]&gclsrc=3p.[id number]        Please store Kesimpta in the refrigerator. If needed, it can be stored at room temperature for 7 days then returned to the fridge to be used within 7 days. If not used within 7 days of returning to the refrigerator, the medication needs to be discarded.     Contact either myself or the neurology clinic if you are sick and due for a Kesimpta injection as it may be necessary to hold the dose until you are feeling better.      Follow-up:   Appointments in Next Year      Jul 16, 2025 12:00 PM  (Arrive by 11:45 AM)  Spine Evaluation with Lizy Baer, PT  [facility] [place] ([facility]) [phone]      Aug 20, 2025 10:00 AM  Pharmacist Visit with Alexia Devlin [facility] ([facility] [place] ) [phone]     Oct 17, 2025 1:00 PM  (Arrive by 12:45 PM)  Return MS with Mohini Arshad MD  [facility] [place] ([facility] [place] ) [phone]            SUBJECTIVE/OBJECTIVE:                          Vanessa Arreola is a 35 year old female seen for an initial visit. She was referred to me from Mohini Arshad.     Reason for visit: Initial MTM - Kesimpta Coordination.    Allergies/ADRs: Reviewed in chart  Past Medical History: Reviewed in chart  Tobacco: She reports that she has never smoked. She has never used smokeless tobacco.  Alcohol: none  Additional Providers: Neurologist: Dr. Arshad    Medication Adherence/Access: no issues reported - will use.    MS  - Tizanidine 4mg three times daily as needed for muscle spasms; infrequent use   - Cyclobenzaprine 5mg three times daily as needed for muscle spasms/pain; infrequent use  - Vitamin D3 2000 units every other day   Last infusion was 2/3/25. Patient had a recurrent reaction  that required additional Benadryl (in addition to IV Benadryl and famotidine as premedications)     Every time she has gone in for her Ocrevus infusions, she will get itching all over including her body and throat. This starts about an hour into the infusion. She has discussed switching to Kesimpta briefly with the neurologist already.     Medication History:   Glatiramer 12/2021-12/2022, radiologic and clinical progression  Ocrevus 12/2/2022-2/3/2025     Headache   Migraine:   Preventive medications  NONE   Acute medications  - Sumatriptan 50 mg at onset of headache for migraine. May repeat 2 hours. Max 4 tablets per 24 hours  Has not had to take the sumatriptan yet.      Hypothyroidism   - Levothyroxine 88 mcg daily.   Patient is having the following  symptoms: none.        Today's Vitals: There were no vitals taken for this visit.  ----------------      I spent 30 minutes with this patient today. All changes were made via collaborative practice agreement with Mohini Arshad.     A summary of these recommendations was sent via [employer].    Alexia Devlin, Pharm.D., MPH  Medication Therapy Management Pharmacist   [facility]    Telemedicine Visit Details  The patient's medications can be safely assessed via a telemedicine encounter.  Type of service:  Telephone visit  Originating Location (pt. Location): Home    Distant Location (provider location):  Off-site  Start Time: 10:00 AM  End Time: 10:30 AM     Medication Therapy Recommendations  No medication therapy recommendations to display

## 2025-07-07 ENCOUNTER — DOCUMENTATION ONLY (OUTPATIENT)
Dept: NEUROLOGY | Facility: CLINIC | Age: 35
End: 2025-07-07
Payer: COMMERCIAL

## 2025-07-07 NOTE — PROGRESS NOTES
Lab results have been received from [facility] for CBC, reports placed in Dr. Arshad's folder for review and signature.   Renny Chaudhari EMT July 7, 2025

## 2025-08-12 ENCOUNTER — TELEPHONE (OUTPATIENT)
Dept: NEUROLOGY | Facility: CLINIC | Age: 35
End: 2025-08-12
Payer: COMMERCIAL